# Patient Record
Sex: FEMALE | Race: WHITE | NOT HISPANIC OR LATINO | URBAN - METROPOLITAN AREA
[De-identification: names, ages, dates, MRNs, and addresses within clinical notes are randomized per-mention and may not be internally consistent; named-entity substitution may affect disease eponyms.]

---

## 2018-05-21 ENCOUNTER — OUTPATIENT (OUTPATIENT)
Dept: OUTPATIENT SERVICES | Facility: HOSPITAL | Age: 23
LOS: 1 days | Discharge: HOME | End: 2018-05-21

## 2018-05-21 DIAGNOSIS — R10.2 PELVIC AND PERINEAL PAIN: ICD-10-CM

## 2018-12-24 ENCOUNTER — TRANSCRIPTION ENCOUNTER (OUTPATIENT)
Age: 23
End: 2018-12-24

## 2023-02-14 ENCOUNTER — APPOINTMENT (OUTPATIENT)
Dept: OBGYN | Facility: CLINIC | Age: 28
End: 2023-02-14
Payer: MEDICAID

## 2023-02-14 DIAGNOSIS — N39.0 URINARY TRACT INFECTION, SITE NOT SPECIFIED: ICD-10-CM

## 2023-02-14 PROBLEM — Z00.00 ENCOUNTER FOR PREVENTIVE HEALTH EXAMINATION: Status: ACTIVE | Noted: 2023-02-14

## 2023-02-14 PROCEDURE — 81002 URINALYSIS NONAUTO W/O SCOPE: CPT

## 2023-02-16 LAB — BACTERIA UR CULT: NORMAL

## 2023-04-03 ENCOUNTER — APPOINTMENT (OUTPATIENT)
Dept: OBGYN | Facility: CLINIC | Age: 28
End: 2023-04-03
Payer: MEDICAID

## 2023-04-03 DIAGNOSIS — T14.8XXA OTHER INJURY OF UNSPECIFIED BODY REGION, INITIAL ENCOUNTER: ICD-10-CM

## 2023-04-03 DIAGNOSIS — S30.814A ABRASION OF VAGINA AND VULVA, INITIAL ENCOUNTER: ICD-10-CM

## 2023-04-03 PROCEDURE — 99203 OFFICE O/P NEW LOW 30 MIN: CPT

## 2023-04-06 ENCOUNTER — APPOINTMENT (OUTPATIENT)
Dept: DERMATOLOGY | Facility: CLINIC | Age: 28
End: 2023-04-06
Payer: MEDICAID

## 2023-04-06 PROCEDURE — 99203 OFFICE O/P NEW LOW 30 MIN: CPT

## 2023-04-11 PROBLEM — Z00.00 ENCOUNTER FOR PREVENTIVE HEALTH EXAMINATION: Status: ACTIVE | Noted: 2023-04-11

## 2023-05-02 ENCOUNTER — APPOINTMENT (OUTPATIENT)
Dept: DERMATOLOGY | Facility: CLINIC | Age: 28
End: 2023-05-02
Payer: MEDICAID

## 2023-05-02 PROCEDURE — 99205 OFFICE O/P NEW HI 60 MIN: CPT

## 2023-05-16 ENCOUNTER — RESULT CHARGE (OUTPATIENT)
Age: 28
End: 2023-05-16

## 2023-05-16 ENCOUNTER — APPOINTMENT (OUTPATIENT)
Dept: OBGYN | Facility: CLINIC | Age: 28
End: 2023-05-16
Payer: MEDICAID

## 2023-05-16 VITALS
BODY MASS INDEX: 32.44 KG/M2 | SYSTOLIC BLOOD PRESSURE: 125 MMHG | DIASTOLIC BLOOD PRESSURE: 86 MMHG | WEIGHT: 190 LBS | HEIGHT: 64 IN

## 2023-05-16 LAB
BILIRUB UR QL STRIP: NORMAL
GLUCOSE UR-MCNC: NORMAL
HCG UR QL: 0.2 EU/DL
HCG UR QL: NEGATIVE
HGB UR QL STRIP.AUTO: NORMAL
KETONES UR-MCNC: NORMAL
LEUKOCYTE ESTERASE UR QL STRIP: NORMAL
NITRITE UR QL STRIP: NORMAL
PH UR STRIP: 6
PROT UR STRIP-MCNC: NORMAL
SP GR UR STRIP: 1.01

## 2023-05-16 PROCEDURE — 99395 PREV VISIT EST AGE 18-39: CPT

## 2023-05-22 DIAGNOSIS — B96.89 ACUTE VAGINITIS: ICD-10-CM

## 2023-05-22 DIAGNOSIS — N76.0 ACUTE VAGINITIS: ICD-10-CM

## 2023-05-22 LAB — CYTOLOGY CVX/VAG DOC THIN PREP: ABNORMAL

## 2023-06-09 RX ORDER — ONDANSETRON 4 MG/1
4 TABLET ORAL
Qty: 30 | Refills: 0 | Status: ACTIVE | COMMUNITY
Start: 2023-06-09 | End: 1900-01-01

## 2023-06-28 ENCOUNTER — EMERGENCY (EMERGENCY)
Facility: HOSPITAL | Age: 28
LOS: 1 days | Discharge: HOME | End: 2023-06-28
Attending: EMERGENCY MEDICINE
Payer: COMMERCIAL

## 2023-06-28 VITALS
HEART RATE: 170 BPM | SYSTOLIC BLOOD PRESSURE: 120 MMHG | OXYGEN SATURATION: 98 % | RESPIRATION RATE: 16 BRPM | DIASTOLIC BLOOD PRESSURE: 77 MMHG | TEMPERATURE: 98 F

## 2023-06-28 VITALS
TEMPERATURE: 98 F | OXYGEN SATURATION: 100 % | RESPIRATION RATE: 18 BRPM | DIASTOLIC BLOOD PRESSURE: 65 MMHG | SYSTOLIC BLOOD PRESSURE: 118 MMHG | HEART RATE: 70 BPM

## 2023-06-28 LAB
ALBUMIN SERPL ELPH-MCNC: 4.8 G/DL — SIGNIFICANT CHANGE UP (ref 3.3–5.2)
ALP SERPL-CCNC: 66 U/L — SIGNIFICANT CHANGE UP (ref 40–120)
ALT FLD-CCNC: 20 U/L — SIGNIFICANT CHANGE UP
ANION GAP SERPL CALC-SCNC: 18 MMOL/L — HIGH (ref 5–17)
AST SERPL-CCNC: 31 U/L — SIGNIFICANT CHANGE UP
BASOPHILS # BLD AUTO: 0.06 K/UL — SIGNIFICANT CHANGE UP (ref 0–0.2)
BASOPHILS NFR BLD AUTO: 0.5 % — SIGNIFICANT CHANGE UP (ref 0–2)
BILIRUB SERPL-MCNC: 0.7 MG/DL — SIGNIFICANT CHANGE UP (ref 0.4–2)
BUN SERPL-MCNC: 10 MG/DL — SIGNIFICANT CHANGE UP (ref 8–20)
CALCIUM SERPL-MCNC: 9.6 MG/DL — SIGNIFICANT CHANGE UP (ref 8.4–10.5)
CHLORIDE SERPL-SCNC: 99 MMOL/L — SIGNIFICANT CHANGE UP (ref 96–108)
CO2 SERPL-SCNC: 18 MMOL/L — LOW (ref 22–29)
CREAT SERPL-MCNC: 0.65 MG/DL — SIGNIFICANT CHANGE UP (ref 0.5–1.3)
EGFR: 122 ML/MIN/1.73M2 — SIGNIFICANT CHANGE UP
EOSINOPHIL # BLD AUTO: 0.1 K/UL — SIGNIFICANT CHANGE UP (ref 0–0.5)
EOSINOPHIL NFR BLD AUTO: 0.8 % — SIGNIFICANT CHANGE UP (ref 0–6)
GLUCOSE SERPL-MCNC: 83 MG/DL — SIGNIFICANT CHANGE UP (ref 70–99)
HCG SERPL-ACNC: <4 MIU/ML — SIGNIFICANT CHANGE UP
HCT VFR BLD CALC: 42.1 % — SIGNIFICANT CHANGE UP (ref 34.5–45)
HGB BLD-MCNC: 15.1 G/DL — SIGNIFICANT CHANGE UP (ref 11.5–15.5)
IMM GRANULOCYTES NFR BLD AUTO: 0.5 % — SIGNIFICANT CHANGE UP (ref 0–0.9)
LYMPHOCYTES # BLD AUTO: 2.51 K/UL — SIGNIFICANT CHANGE UP (ref 1–3.3)
LYMPHOCYTES # BLD AUTO: 20.9 % — SIGNIFICANT CHANGE UP (ref 13–44)
MCHC RBC-ENTMCNC: 31 PG — SIGNIFICANT CHANGE UP (ref 27–34)
MCHC RBC-ENTMCNC: 35.9 GM/DL — SIGNIFICANT CHANGE UP (ref 32–36)
MCV RBC AUTO: 86.4 FL — SIGNIFICANT CHANGE UP (ref 80–100)
MONOCYTES # BLD AUTO: 0.83 K/UL — SIGNIFICANT CHANGE UP (ref 0–0.9)
MONOCYTES NFR BLD AUTO: 6.9 % — SIGNIFICANT CHANGE UP (ref 2–14)
NEUTROPHILS # BLD AUTO: 8.47 K/UL — HIGH (ref 1.8–7.4)
NEUTROPHILS NFR BLD AUTO: 70.4 % — SIGNIFICANT CHANGE UP (ref 43–77)
PLATELET # BLD AUTO: 296 K/UL — SIGNIFICANT CHANGE UP (ref 150–400)
POTASSIUM SERPL-MCNC: 4.2 MMOL/L — SIGNIFICANT CHANGE UP (ref 3.5–5.3)
POTASSIUM SERPL-SCNC: 4.2 MMOL/L — SIGNIFICANT CHANGE UP (ref 3.5–5.3)
PROT SERPL-MCNC: 8.5 G/DL — SIGNIFICANT CHANGE UP (ref 6.6–8.7)
RBC # BLD: 4.87 M/UL — SIGNIFICANT CHANGE UP (ref 3.8–5.2)
RBC # FLD: 12 % — SIGNIFICANT CHANGE UP (ref 10.3–14.5)
SODIUM SERPL-SCNC: 135 MMOL/L — SIGNIFICANT CHANGE UP (ref 135–145)
T3 SERPL-MCNC: 123 NG/DL — SIGNIFICANT CHANGE UP (ref 80–200)
T4 AB SER-ACNC: 7 UG/DL — SIGNIFICANT CHANGE UP (ref 4.5–12)
TROPONIN T SERPL-MCNC: <0.01 NG/ML — SIGNIFICANT CHANGE UP (ref 0–0.06)
TSH SERPL-MCNC: 2.73 UIU/ML — SIGNIFICANT CHANGE UP (ref 0.27–4.2)
WBC # BLD: 12.03 K/UL — HIGH (ref 3.8–10.5)
WBC # FLD AUTO: 12.03 K/UL — HIGH (ref 3.8–10.5)

## 2023-06-28 PROCEDURE — 99285 EMERGENCY DEPT VISIT HI MDM: CPT

## 2023-06-28 PROCEDURE — 71045 X-RAY EXAM CHEST 1 VIEW: CPT

## 2023-06-28 PROCEDURE — 84484 ASSAY OF TROPONIN QUANT: CPT

## 2023-06-28 PROCEDURE — 84436 ASSAY OF TOTAL THYROXINE: CPT

## 2023-06-28 PROCEDURE — 93010 ELECTROCARDIOGRAM REPORT: CPT | Mod: 76

## 2023-06-28 PROCEDURE — 71045 X-RAY EXAM CHEST 1 VIEW: CPT | Mod: 26

## 2023-06-28 PROCEDURE — 93005 ELECTROCARDIOGRAM TRACING: CPT

## 2023-06-28 PROCEDURE — 80053 COMPREHEN METABOLIC PANEL: CPT

## 2023-06-28 PROCEDURE — 36415 COLL VENOUS BLD VENIPUNCTURE: CPT

## 2023-06-28 PROCEDURE — 99285 EMERGENCY DEPT VISIT HI MDM: CPT | Mod: 25

## 2023-06-28 PROCEDURE — 84443 ASSAY THYROID STIM HORMONE: CPT

## 2023-06-28 PROCEDURE — 85025 COMPLETE CBC W/AUTO DIFF WBC: CPT

## 2023-06-28 PROCEDURE — 99291 CRITICAL CARE FIRST HOUR: CPT

## 2023-06-28 PROCEDURE — 84702 CHORIONIC GONADOTROPIN TEST: CPT

## 2023-06-28 PROCEDURE — 84480 ASSAY TRIIODOTHYRONINE (T3): CPT

## 2023-06-28 NOTE — ED ADULT NURSE NOTE - CHIEF COMPLAINT QUOTE
pt comes to ed from work- reports after lunch started feeling her heart rate was high- patient noted to have a HR of 170 in triage. Direct to [facility] for ekg. no other symptoms- no fevers or recent illness.

## 2023-06-28 NOTE — ED PROVIDER NOTE - CARE PROVIDER_API CALL
Marlene Posey  Cardiology  [address] [facility], [address] [place] [address]  Phone: [phone]  Fax: [phone]  Follow Up Time:

## 2023-06-28 NOTE — ED PROVIDER NOTE - PATIENT PORTAL LINK FT
You can access the FollowMyHealth Patient Portal offered by [facility] by registering at the following website: [email or web address]. By joining [facility]’s FollowMyHealth portal, you will also be able to view your health information using other applications (apps) compatible with our system.

## 2023-06-28 NOTE — ED PROVIDER NOTE - ADDITIONAL NOTES AND INSTRUCTIONS:
Ms. Castillo was seen in the emergency room on 6/28. Based on evaluation by this physician, she may return to work tomorrow, 6/28. She should also follow up with the specialist outpatient as discussed.

## 2023-06-28 NOTE — ED ADULT NURSE REASSESSMENT NOTE - NS ED NURSE REASSESS COMMENT FT1
assumed care from critical RN. pt a&o x4, RR even and unlabored, skin warm and dry. pt HR 83 in NSR on CM, resting comfortably in bed, no complaints at this time.

## 2023-06-28 NOTE — ED ADULT TRIAGE NOTE - CHIEF COMPLAINT QUOTE
pt comes to ed from work- reports after lunch started feeling her heart rate was high- patient noted to have a HR of 170 in triage. Direct to [facility] for ekg. no other symptoms- no fevers or recent illness.

## 2023-06-28 NOTE — ED PROVIDER NOTE - PHYSICAL EXAMINATION
General: well appearing, NAD  Head: NC/AT  Cardiac: tachycardic, regular, no m/r/g  Respiratory: CTABL, equal chest wall expansions, no conversational dyspnea  Abdomen: soft, ND, NT  Neuro: AAOx3, coordinated movement of all 4 extremities  Psych: anxious, cooperative, normal affect  Skin: warm and dry

## 2023-06-28 NOTE — ED PROVIDER NOTE - CLINICAL SUMMARY MEDICAL DECISION MAKING FREE TEXT BOX
28-year-old female W/PMH Chiari I malformation, bipolar noncompliant with meds, anxiety, polysubstance use presents for tachycardia.  EKG shows SVT 160s. Adenosine 6mg then 12mg given, with conversion to NSR with HR 90s. Labs pending.

## 2023-06-28 NOTE — ED PROVIDER NOTE - NS ED ROS FT
Constitutional: no fever, no sweats, and no chills.  CV: no chest pain, no edema. +palpitations  Resp: no cough, no dyspnea  GI: no abdominal pain, +nausea and no vomiting.  MSK: no msk pain, no weakness  Skin: no lesions, and no rashes.  Neuro: no LOC, no headache, no dizziness  ROS otherwise negative except as noted in HPI.

## 2023-06-28 NOTE — ED PROVIDER NOTE - OBJECTIVE STATEMENT
28-year-old female W/PMH Chiari I malformation, bipolar noncompliant with meds, anxiety, polysubstance use presents for tachycardia.  Reports she just got back from lunch, was seated, developed nausea and palpitations. Denies fever/chills, chest pain, SOB, vomiting. Denies any similar symptoms in the past.  Reports her Apple Watch showed a heart rate 160s, and the doctor she works with told her to go to the ER.  Denies any personal cardiac history.  Reports family history uncle just had a hole in his heart repaired. Reports she smokes marijuana daily, uses alcohol occasionally, cocaine occasionally, shrooms rarely. Reports only marijuana the past few days, last cocaine 7 months ago. +smoker.

## 2023-06-28 NOTE — ED PROVIDER NOTE - NSFOLLOWUPINSTRUCTIONS_ED_ALL_ED_FT
- Follow up with cardiology at the next available appointment.   - Return to the ED for any new or worsening symptoms, such as chest pain, shortness of breath, or passing out.      Supraventricular Tachycardia, Adult  Supraventricular tachycardia (SVT) is a type of abnormal heart rhythm. It causes the heart to beat very quickly. SVT can start suddenly and last for a short time, which is called paroxysmal SVT, or it may last longer and require specialized treatment to return the heart rhythm to normal.    A normal resting heart rate is 60–100 beats per minute. During an episode of SVT, your heart rate may be higher than 150 beats per minute. Episodes of SVT can be frightening, but they are usually not dangerous. However, if episodes happen several times a day or last longer than a few seconds, they may lead to heart failure.    What are the causes?    Usually, a normal heartbeat starts when an area called the sinoatrial node releases an electrical signal. In SVT, other areas of the heart send out electrical signals that interfere with the signal from the sinoatrial node. The cause of this abnormal electrical activity is not known.    What increases the risk?  You are more likely to develop this condition if you are:  Middle aged or younger.  Female.  The following factors may also make you more likely to develop this condition:  Stress or anxiety.  Tiredness.  Smoking.  Stimulant drugs, such as cocaine and methamphetamine.  Alcohol.  Caffeine.  Pregnancy.  Having any of these conditions:  A thyroid condition.  Diabetes mellitus.  Obstructive sleep apnea.  What are the signs or symptoms?  Symptoms of this condition include:  A pounding heart.  A feeling that the heart is skipping beats (palpitations).  Weakness.  Shortness of breath.  Tightness or pain in your chest.  Light-headedness or dizziness.  Anxiety.  Sweating.  Nausea.  Fainting.  Fatigue or tiredness.  A mild episode may not cause symptoms.    How is this diagnosed?  This condition may be diagnosed based on:  Your symptoms.  A physical exam. If you have an episode of SVT during the exam, the health care provider may be able to diagnose SVT by listening to your heart and feeling your pulse.  Tests. These may include:  An electrocardiogram (ECG). This test is done to check for problems with electrical activity in the heart.  A Holter monitor or event monitor test. This test involves wearing a portable device that monitors your heart rate over time.  An echocardiogram. This test involves taking an image of your heart using sound waves. It is done to rule out other causes of a fast heart rate.  A stress echocardiogram. This test involves doing an echocardiogram when you are at rest and after exercise.  Blood tests.  An electrophysiology study (EPS). This tests the electrical activity in your heart to find where the abnormal heart rhythm is coming from using cardiac catheters.  How is this treated?  This condition may be treated with:  Vagal nerve stimulation. This involves stimulating your vagus nerve, which is a nerve that runs from the chest, through the neck, to the lower part of the brain. Stimulating this nerve can slow down the heart. It is often the first and only treatment that is needed for this condition. Work with your health care provider to find which technique works best for you. Ways to do this treatment include:  Laying on your back, then holding your breath and pushing, as though you are having a bowel movement.  Massaging an area on one side of your neck, below your jaw. Do not try this yourself. Only a health care provider should do this. If done the wrong way, it can lead to a stroke.  Bending forward with your head between your legs.  Coughing while bending forward with your head between your legs.  Applying an ice-cold, wet towel to your face.  Medicines that prevent attacks.  Medicine to stop an attack. The medicine is given through an IV at the hospital.  A small electric shock (cardioversion) that stops an attack. Before you get the shock, you will get medicine to make you fall asleep.  Radiofrequency ablation. In this procedure, a small, thin tube (catheter) is used to send radiofrequency energy to the area of tissue that is causing the rapid heartbeats. The energy kills the cells and helps your heart keep a normal rhythm. You may have this treatment if you have symptoms of SVT often.  If you do not have symptoms, you may not need treatment.    Follow these instructions at home:  Stress    Avoid stressful situations when possible.  Find healthy ways of managing stress, such as:  Taking part in relaxing activities, such as yoga, meditation, or being out in nature.  Listening to relaxing music.  Practicing relaxation techniques, such as deep breathing.  Leading a healthy lifestyle. This involves getting plenty of sleep, exercising, and eating a balanced diet.  Attending counseling or talk therapy with a mental health professional.  Lifestyle      Try to get at least 7 hours of sleep each night.  Do not use any products that contain nicotine or tobacco. These products include cigarettes, chewing tobacco, and vaping devices, such as e-cigarettes. If you need help quitting, ask your health care provider.  Do not drink alcohol if it triggers episodes of SVT.  If alcohol does not seem to trigger episodes, limit your alcohol intake. If you drink alcohol:  Limit how much you have to:  0–1 drink a day for women who are not pregnant.  0–2 drinks a day for men.  Know how much alcohol is in your drink. In the [place] one drink equals one 12 oz bottle of beer (355 mL), one 5 oz glass of wine (148 mL), or one 1½ oz glass of hard liquor (44 mL).  Be aware of how caffeine affects your condition. If caffeine:  Triggers episodes of SVT, do not eat, drink, or use anything with caffeine in it.  Does not seem to trigger episodes, consume caffeine in moderation.  Do not use stimulant drugs. If you need help quitting, talk with your health care provider.  General instructions    Maintain a healthy weight.  Exercise regularly. Ask your health care provider to suggest some good activities for you. Aim for one or a combination of the followin minutes per week of moderate exercise, such as walking or yoga.  75 minutes per week of vigorous exercise, such as running or swimming.  Perform vagus nerve stimulation as directed by your health care provider.  Take over-the-counter and prescription medicines only as told by your health care provider.  Keep all follow-up visits. This is important.  Contact a health care provider if:  You have episodes of SVT more often than before.  Episodes of SVT last longer than before.  Vagus nerve stimulation is no longer helping.  You have new symptoms.  Get help right away if:  You have chest pain.  Your symptoms get worse.  You have trouble breathing.  You have an episode of SVT that lasts longer than 20 minutes.  You faint.  These symptoms may represent a serious problem that is an emergency. Do not wait to see if the symptoms will go away. Get medical help right away. Call your local emergency services (911 in the [place] Do not drive yourself to the hospital.    Summary  Supraventricular tachycardia (SVT) is a type of abnormal heart rhythm.  During an episode of SVT, your heart rate may be higher than 150 beats per minute.  If you do not have symptoms, you may not need treatment.  This information is not intended to replace advice given to you by your health care provider. Make sure you discuss any questions you have with your health care provider.

## 2023-06-29 ENCOUNTER — NON-APPOINTMENT (OUTPATIENT)
Age: 28
End: 2023-06-29

## 2023-06-29 ENCOUNTER — APPOINTMENT (OUTPATIENT)
Dept: CARDIOLOGY | Facility: CLINIC | Age: 28
End: 2023-06-29
Payer: MEDICAID

## 2023-06-29 VITALS
SYSTOLIC BLOOD PRESSURE: 104 MMHG | HEIGHT: 64 IN | OXYGEN SATURATION: 97 % | HEART RATE: 87 BPM | DIASTOLIC BLOOD PRESSURE: 67 MMHG | BODY MASS INDEX: 33.12 KG/M2 | WEIGHT: 194 LBS

## 2023-06-29 VITALS — SYSTOLIC BLOOD PRESSURE: 115 MMHG | DIASTOLIC BLOOD PRESSURE: 74 MMHG

## 2023-06-29 DIAGNOSIS — G40.409 OTHER GENERALIZED EPILEPSY AND EPILEPTIC SYNDROMES, NOT INTRACTABLE, W/OUT STATUS EPILEPTICUS: ICD-10-CM

## 2023-06-29 DIAGNOSIS — F17.200 NICOTINE DEPENDENCE, UNSPECIFIED, UNCOMPLICATED: ICD-10-CM

## 2023-06-29 DIAGNOSIS — F31.9 BIPOLAR DISORDER, UNSPECIFIED: ICD-10-CM

## 2023-06-29 DIAGNOSIS — Z78.9 OTHER SPECIFIED HEALTH STATUS: ICD-10-CM

## 2023-06-29 DIAGNOSIS — G93.5 COMPRESSION OF BRAIN: ICD-10-CM

## 2023-06-29 PROCEDURE — 99204 OFFICE O/P NEW MOD 45 MIN: CPT | Mod: 25

## 2023-06-29 PROCEDURE — 93000 ELECTROCARDIOGRAM COMPLETE: CPT

## 2023-06-29 RX ORDER — METRONIDAZOLE 500 MG/1
500 TABLET ORAL TWICE DAILY
Qty: 14 | Refills: 0 | Status: DISCONTINUED | COMMUNITY
Start: 2023-05-22 | End: 2023-06-29

## 2023-06-29 RX ORDER — LIDOCAINE 5 G/100G
5 OINTMENT TOPICAL
Qty: 1 | Refills: 1 | Status: DISCONTINUED | COMMUNITY
Start: 2023-04-03 | End: 2023-06-29

## 2023-06-29 RX ORDER — NITROFURANTOIN (MONOHYDRATE/MACROCRYSTALS) 25; 75 MG/1; MG/1
100 CAPSULE ORAL TWICE DAILY
Qty: 10 | Refills: 0 | Status: DISCONTINUED | COMMUNITY
Start: 2023-02-14 | End: 2023-06-29

## 2023-06-29 NOTE — PHYSICAL EXAM

## 2023-06-29 NOTE — REVIEW OF SYSTEMS
[Negative] : Heme/Lymph [FreeTextEntry5] : See HPI [de-identified] : See HPI [de-identified] : See HPI

## 2023-06-29 NOTE — ASSESSMENT
[FreeTextEntry1] : EKG: June 29, 2023: Normal sinus rhythm, normal ECG\par \par Normal TSH done in the ER on June 28, 2023.  Normal labs\par \par Assessment:\par 1.  PSVT\par 2.  Polysubstance abuse\par 3.  Bipolar disorder\par 4.  Chiari malformation\par \par Recommendations:\par 1.  Echocardiogram and regular stress test\par 2.  30-day event monitor\par 3.  EP consultation\par 4.  Patient counseled against substance abuse.  Patient is also strongly recommended to establish with a PCP.\par 5.  Follow-up in 3 months

## 2023-06-29 NOTE — HISTORY OF PRESENT ILLNESS
[FreeTextEntry1] : HPI: Patient is a 28-year-old  female W/PMH Chiari I malformation, bipolar noncompliant with meds, anxiety, polysubstance use who was seen at [facility] on June 28 for symptoms of palpitations.  Hospital records reviewed.  Patient had a PSVT and it was treated with IV adenosine converted to sinus rhythm.  Hospital lab work normal including a normal TSH.  I could not see the EKG in the hospital system.  Patient is having symptoms of intermittent palpitations lasting up to 10 minutes for at least a year, she has 1 episode at least once a week, she had an episode of syncope about a year ago.  Patient denies any chest pain, orthopnea, PND or leg edema\par \par \par \par \par PMH: Bipolar disorder, Chiari malformation.  Noncompliant with her medical therapy.  No diabetes or hypertension.\par \par Social History: Patient admits to smoking history of cocaine use occasionally, history of drinking alcohol\par \par Family history: Noncontributory.\par \par ER HPI\par 28-year-old female W/PMH Chiari I malformation, bipolar noncompliant with meds, anxiety, polysubstance use presents for tachycardia.  Reports she just got back from lunch, was seated, developed nausea and palpitations. Denies fever/chills, chest pain, SOB, vomiting. Denies any similar symptoms in the past.  Reports her Apple Watch showed a heart rate 160s, and the doctor she works with told her to go to the ER.  Denies any personal cardiac history.  Reports family history uncle just had a hole in his heart repaired. Reports she smokes marijuana daily, uses alcohol occasionally, cocaine occasionally, shrooms rarely. Reports only marijuana the past few days, last cocaine 7 months ago. +smoker.

## 2023-07-02 LAB — BACTERIA UR CULT: NORMAL

## 2023-07-11 DIAGNOSIS — Z87.718 PERSONAL HISTORY OF OTHER SPECIFIED (CORRECTED) CONGENITAL MALFORMATIONS OF GENITOURINARY SYSTEM: ICD-10-CM

## 2023-07-11 DIAGNOSIS — Z87.42 PERSONAL HISTORY OF OTHER DISEASES OF THE FEMALE GENITAL TRACT: ICD-10-CM

## 2023-07-11 DIAGNOSIS — Z86.59 PERSONAL HISTORY OF OTHER MENTAL AND BEHAVIORAL DISORDERS: ICD-10-CM

## 2023-07-11 PROBLEM — S30.814A VAGINAL ABRASION, INITIAL ENCOUNTER: Status: ACTIVE | Noted: 2023-07-11

## 2023-07-11 NOTE — PLAN
[FreeTextEntry1] : 28-year-old female with vaginal abrasion after intercourse.  We discussed that this is most likely the cause of her symptoms.  Discussed with the patient that this area can be very tender when urinating.  Rx was provided for lidocaine ointment.  Also discussed peribottle when urinating.  Advised to keep the area as clean and dry as possible.  If she is not feeling better in 1 week's time she can return to the office.  She will return to the office in May 2023 for her well woman exam.  The patient was given the opportunity to ask questions and all were answered to her satisfaction.\par

## 2023-07-11 NOTE — HISTORY OF PRESENT ILLNESS
[FreeTextEntry1] : 28-year-old female presents for visit to discuss vaginal discomfort.  She is a new patient to our practice.  Her last annual exam was a little over 1 year ago.  She states she had intercourse yesterday and thinks she has a cut near the vaginal area.  She states her vaginal area is very tender and it is stinging when she is urinating.  She denies any urinary frequency, urgency, or dysuria.  She states she looked in her vaginal area today and thinks that there is a small laceration.  She is not having any vaginal bleeding.\par \par Past medical history is significant for bipolar disorder, ADHD, anxiety, seizures, and a Budd-Chiari malformation.  She has never had surgery.  She is unsure of her family history.  She does smoke tobacco approximately 1 pack every 3 days.  She socially drinks alcohol.  She denies illicit drugs.  GYN history: –45/3–7.  She has a history of PCOS.  She was on OCPs in the past but states they gave her night terrors.  She was told in the past that she has a bicornuate uterus.  She has a history of ovarian cysts.  She has a history of an abnormal Pap smear with positive HPV.  She has no other significant GYN history.  OB history: -0-1-0.  She has a history of a medical etop.  No known drug allergies.  She takes Lamictal and Prozac.

## 2023-07-11 NOTE — HISTORY OF PRESENT ILLNESS
[FreeTextEntry1] : 28-year-old female presents for well woman exam.  She has no complaints today.  She has a history of PCOS.  Menses are currently every 28 to 45 days, lasting 3 to 7 days.  She denies heavy bleeding.  She denies cramping.  She was on OCPs in the past to help regulate her cycle but they gave her night terrors.  She has never went more than 3 months without her menses.  She is not currently interested in starting contraception.  She has a history of an ovarian cyst and an abnormal Pap smear.  She was also told in the past she has a bicornuate uterus.\par \par \par Past medical history is significant for bipolar disorder, ADHD, anxiety, seizures, and a Budd-Chiari malformation.  She has never had surgery.  She is unsure of her family history.  She does smoke tobacco approximately 1 pack every 3 days.  She socially drinks alcohol.  She denies illicit drugs.  GYN history as above.  OB history: -0-1-0.  She has a history of a medical etop.  No known drug allergies.  She takes Lamictal and Prozac.

## 2023-07-11 NOTE — PHYSICAL EXAM
[Chaperone Declined] : Patient declined chaperone [Appropriately responsive] : appropriately responsive [Alert] : alert [No Acute Distress] : no acute distress [Soft] : soft [Non-tender] : non-tender [Non-distended] : non-distended [No HSM] : No HSM [No Lesions] : no lesions [No Mass] : no mass [Oriented x3] : oriented x3 [Labia Majora] : normal [Labia Minora] : normal [Normal] : normal [Uterine Adnexae] : normal [FreeTextEntry1] : + 5mm abrasion to inner portion of right labia minora

## 2023-07-11 NOTE — PLAN
[FreeTextEntry1] : 28-year-old female for well woman exam\par \par 1.  Pap done\par 2.  Self breast exam reviewed\par 3.  History of PCOS.  Menses are currently every 28 to 45 days.  We discussed if she goes more than 3 months without her menses she should call the office.  She is aware of the risks of endometrial hyperplasia/cancer in the future if she does not get her menses at least every 3 months.\par 4.  History of bicornuate uterus.  Declines further work-up at this time.  Aware of potential future fertility/pregnancy complications.\par 5.  Annual exam in 1 year

## 2023-07-18 ENCOUNTER — NON-APPOINTMENT (OUTPATIENT)
Age: 28
End: 2023-07-18

## 2023-07-26 ENCOUNTER — APPOINTMENT (OUTPATIENT)
Dept: ELECTROPHYSIOLOGY | Facility: CLINIC | Age: 28
End: 2023-07-26
Payer: MEDICAID

## 2023-07-26 VITALS
SYSTOLIC BLOOD PRESSURE: 118 MMHG | BODY MASS INDEX: 32.78 KG/M2 | WEIGHT: 192 LBS | OXYGEN SATURATION: 97 % | DIASTOLIC BLOOD PRESSURE: 64 MMHG | HEIGHT: 64 IN | HEART RATE: 86 BPM | TEMPERATURE: 98.9 F

## 2023-07-26 PROCEDURE — 93000 ELECTROCARDIOGRAM COMPLETE: CPT

## 2023-07-26 PROCEDURE — 99203 OFFICE O/P NEW LOW 30 MIN: CPT | Mod: 25

## 2023-07-26 NOTE — HISTORY OF PRESENT ILLNESS
[FreeTextEntry1] : Recent ER visit for prolonged palpitations and elevated HR. Approx 160-180 bpm. Was given adenosine x2 and reports symptoms gradually subsided. Reports prior hx of of palpitations with respect to anxiety. She is wearing a biotel monitor which she will complete next week. Echo and stress test pending. No further episodes since then.

## 2023-08-11 ENCOUNTER — APPOINTMENT (OUTPATIENT)
Dept: DERMATOLOGY | Facility: CLINIC | Age: 28
End: 2023-08-11
Payer: MEDICAID

## 2023-08-11 PROCEDURE — 99213 OFFICE O/P EST LOW 20 MIN: CPT

## 2023-08-14 ENCOUNTER — NON-APPOINTMENT (OUTPATIENT)
Age: 28
End: 2023-08-14

## 2023-08-21 DIAGNOSIS — N94.6 DYSMENORRHEA, UNSPECIFIED: ICD-10-CM

## 2023-09-05 ENCOUNTER — APPOINTMENT (OUTPATIENT)
Dept: CARDIOLOGY | Facility: CLINIC | Age: 28
End: 2023-09-05
Payer: MEDICAID

## 2023-09-05 PROCEDURE — 93306 TTE W/DOPPLER COMPLETE: CPT

## 2023-09-05 PROCEDURE — 93015 CV STRESS TEST SUPVJ I&R: CPT

## 2023-09-25 ENCOUNTER — APPOINTMENT (OUTPATIENT)
Dept: CARDIOLOGY | Facility: CLINIC | Age: 28
End: 2023-09-25

## 2023-09-29 ENCOUNTER — APPOINTMENT (OUTPATIENT)
Dept: ELECTROPHYSIOLOGY | Facility: CLINIC | Age: 28
End: 2023-09-29

## 2023-10-12 PROBLEM — G93.5 COMPRESSION OF BRAIN: Chronic | Status: ACTIVE | Noted: 2023-06-28

## 2023-10-12 PROBLEM — F31.9 BIPOLAR DISORDER, UNSPECIFIED: Chronic | Status: ACTIVE | Noted: 2023-06-28

## 2023-10-24 ENCOUNTER — RX RENEWAL (OUTPATIENT)
Age: 28
End: 2023-10-24

## 2023-11-07 DIAGNOSIS — L25.9 UNSPECIFIED CONTACT DERMATITIS, UNSPECIFIED CAUSE: ICD-10-CM

## 2023-11-24 ENCOUNTER — NON-APPOINTMENT (OUTPATIENT)
Age: 28
End: 2023-11-24

## 2023-11-24 ENCOUNTER — APPOINTMENT (OUTPATIENT)
Dept: ELECTROPHYSIOLOGY | Facility: CLINIC | Age: 28
End: 2023-11-24
Payer: MEDICAID

## 2023-11-24 VITALS
SYSTOLIC BLOOD PRESSURE: 126 MMHG | HEIGHT: 64 IN | OXYGEN SATURATION: 99 % | HEART RATE: 76 BPM | BODY MASS INDEX: 33.12 KG/M2 | WEIGHT: 194 LBS | DIASTOLIC BLOOD PRESSURE: 74 MMHG

## 2023-11-24 PROCEDURE — 93000 ELECTROCARDIOGRAM COMPLETE: CPT

## 2023-11-24 PROCEDURE — 99214 OFFICE O/P EST MOD 30 MIN: CPT | Mod: 25

## 2023-12-04 DIAGNOSIS — R68.82 DECREASED LIBIDO: ICD-10-CM

## 2023-12-13 DIAGNOSIS — O21.9 VOMITING OF PREGNANCY, UNSPECIFIED: ICD-10-CM

## 2024-02-08 DIAGNOSIS — N39.3 STRESS INCONTINENCE (FEMALE) (MALE): ICD-10-CM

## 2024-02-22 ENCOUNTER — APPOINTMENT (OUTPATIENT)
Dept: OBGYN | Facility: CLINIC | Age: 29
End: 2024-02-22
Payer: SELF-PAY

## 2024-02-22 DIAGNOSIS — F41.9 ANXIETY DISORDER, UNSPECIFIED: ICD-10-CM

## 2024-02-22 DIAGNOSIS — F43.9 REACTION TO SEVERE STRESS, UNSPECIFIED: ICD-10-CM

## 2024-02-22 PROCEDURE — 97810 ACUP 1/> WO ESTIM 1ST 15 MIN: CPT

## 2024-02-26 ENCOUNTER — TRANSCRIPTION ENCOUNTER (OUTPATIENT)
Age: 29
End: 2024-02-26

## 2024-02-26 ENCOUNTER — INPATIENT (INPATIENT)
Facility: HOSPITAL | Age: 29
LOS: 0 days | Discharge: HOME | DRG: 273 | End: 2024-02-27
Attending: INTERNAL MEDICINE | Admitting: INTERNAL MEDICINE
Payer: COMMERCIAL

## 2024-02-26 VITALS
DIASTOLIC BLOOD PRESSURE: 60 MMHG | WEIGHT: 190.04 LBS | HEART RATE: 81 BPM | SYSTOLIC BLOOD PRESSURE: 110 MMHG | HEIGHT: 64 IN | OXYGEN SATURATION: 95 % | RESPIRATION RATE: 16 BRPM

## 2024-02-26 DIAGNOSIS — I47.1 SUPRAVENTRICULAR TACHYCARDIA: ICD-10-CM

## 2024-02-26 DIAGNOSIS — J90 PLEURAL EFFUSION, NOT ELSEWHERE CLASSIFIED: ICD-10-CM

## 2024-02-26 LAB
ABO RH CONFIRMATION: SIGNIFICANT CHANGE UP
ANION GAP SERPL CALC-SCNC: 11 MMOL/L — SIGNIFICANT CHANGE UP (ref 5–17)
APTT BLD: 31.8 SEC — SIGNIFICANT CHANGE UP (ref 24.5–35.6)
BLD GP AB SCN SERPL QL: SIGNIFICANT CHANGE UP
BUN SERPL-MCNC: 10.5 MG/DL — SIGNIFICANT CHANGE UP (ref 8–20)
CALCIUM SERPL-MCNC: 9.1 MG/DL — SIGNIFICANT CHANGE UP (ref 8.4–10.5)
CHLORIDE SERPL-SCNC: 106 MMOL/L — SIGNIFICANT CHANGE UP (ref 96–108)
CO2 SERPL-SCNC: 22 MMOL/L — SIGNIFICANT CHANGE UP (ref 22–29)
CREAT SERPL-MCNC: 0.59 MG/DL — SIGNIFICANT CHANGE UP (ref 0.5–1.3)
EGFR: 125 ML/MIN/1.73M2 — SIGNIFICANT CHANGE UP
GLUCOSE SERPL-MCNC: 104 MG/DL — HIGH (ref 70–99)
HCG SERPL QL: NEGATIVE — SIGNIFICANT CHANGE UP
HCT VFR BLD CALC: 41 % — SIGNIFICANT CHANGE UP (ref 34.5–45)
HGB BLD-MCNC: 14.5 G/DL — SIGNIFICANT CHANGE UP (ref 11.5–15.5)
INR BLD: 0.9 RATIO — SIGNIFICANT CHANGE UP (ref 0.85–1.18)
MAGNESIUM SERPL-MCNC: 1.9 MG/DL — SIGNIFICANT CHANGE UP (ref 1.6–2.6)
MCHC RBC-ENTMCNC: 30.6 PG — SIGNIFICANT CHANGE UP (ref 27–34)
MCHC RBC-ENTMCNC: 35.4 GM/DL — SIGNIFICANT CHANGE UP (ref 32–36)
MCV RBC AUTO: 86.5 FL — SIGNIFICANT CHANGE UP (ref 80–100)
PLATELET # BLD AUTO: 242 K/UL — SIGNIFICANT CHANGE UP (ref 150–400)
POTASSIUM SERPL-MCNC: 3.9 MMOL/L — SIGNIFICANT CHANGE UP (ref 3.5–5.3)
POTASSIUM SERPL-SCNC: 3.9 MMOL/L — SIGNIFICANT CHANGE UP (ref 3.5–5.3)
PROTHROM AB SERPL-ACNC: 10 SEC — SIGNIFICANT CHANGE UP (ref 9.5–13)
RBC # BLD: 4.74 M/UL — SIGNIFICANT CHANGE UP (ref 3.8–5.2)
RBC # FLD: 12.9 % — SIGNIFICANT CHANGE UP (ref 10.3–14.5)
SODIUM SERPL-SCNC: 139 MMOL/L — SIGNIFICANT CHANGE UP (ref 135–145)
WBC # BLD: 9.05 K/UL — SIGNIFICANT CHANGE UP (ref 3.8–10.5)
WBC # FLD AUTO: 9.05 K/UL — SIGNIFICANT CHANGE UP (ref 3.8–10.5)

## 2024-02-26 PROCEDURE — 93623 PRGRMD STIMJ&PACG IV RX NFS: CPT | Mod: 26

## 2024-02-26 PROCEDURE — 93010 ELECTROCARDIOGRAM REPORT: CPT

## 2024-02-26 PROCEDURE — 93653 COMPRE EP EVAL TX SVT: CPT

## 2024-02-26 PROCEDURE — 93462 L HRT CATH TRNSPTL PUNCTURE: CPT

## 2024-02-26 PROCEDURE — 93662 INTRACARDIAC ECG (ICE): CPT | Mod: 26

## 2024-02-26 RX ORDER — ALPRAZOLAM 0.25 MG
0.25 TABLET ORAL EVERY 6 HOURS
Refills: 0 | Status: DISCONTINUED | OUTPATIENT
Start: 2024-02-26 | End: 2024-02-27

## 2024-02-26 RX ORDER — ACETAMINOPHEN 500 MG
650 TABLET ORAL EVERY 6 HOURS
Refills: 0 | Status: DISCONTINUED | OUTPATIENT
Start: 2024-02-26 | End: 2024-02-27

## 2024-02-26 RX ORDER — CARVEDILOL PHOSPHATE 80 MG/1
3.12 CAPSULE, EXTENDED RELEASE ORAL EVERY 12 HOURS
Refills: 0 | Status: DISCONTINUED | OUTPATIENT
Start: 2024-02-26 | End: 2024-02-27

## 2024-02-26 RX ORDER — NICOTINE POLACRILEX 2 MG
1 GUM BUCCAL DAILY
Refills: 0 | Status: DISCONTINUED | OUTPATIENT
Start: 2024-02-26 | End: 2024-02-27

## 2024-02-26 RX ORDER — APIXABAN 2.5 MG/1
2.5 TABLET, FILM COATED ORAL
Refills: 0 | Status: DISCONTINUED | OUTPATIENT
Start: 2024-02-26 | End: 2024-02-27

## 2024-02-26 RX ORDER — BENZOCAINE AND MENTHOL 5; 1 G/100ML; G/100ML
1 LIQUID ORAL
Refills: 0 | Status: DISCONTINUED | OUTPATIENT
Start: 2024-02-26 | End: 2024-02-27

## 2024-02-26 RX ORDER — KETOROLAC TROMETHAMINE 30 MG/ML
15 SYRINGE (ML) INJECTION ONCE
Refills: 0 | Status: DISCONTINUED | OUTPATIENT
Start: 2024-02-26 | End: 2024-02-27

## 2024-02-26 RX ORDER — OXYCODONE AND ACETAMINOPHEN 5; 325 MG/1; MG/1
1 TABLET ORAL EVERY 6 HOURS
Refills: 0 | Status: DISCONTINUED | OUTPATIENT
Start: 2024-02-26 | End: 2024-02-27

## 2024-02-26 RX ADMIN — Medication 1 PATCH: at 20:02

## 2024-02-26 RX ADMIN — CARVEDILOL PHOSPHATE 3.12 MILLIGRAM(S): 80 CAPSULE, EXTENDED RELEASE ORAL at 17:42

## 2024-02-26 RX ADMIN — Medication 650 MILLIGRAM(S): at 16:09

## 2024-02-26 RX ADMIN — Medication 650 MILLIGRAM(S): at 15:21

## 2024-02-26 RX ADMIN — APIXABAN 2.5 MILLIGRAM(S): 2.5 TABLET, FILM COATED ORAL at 20:02

## 2024-02-26 NOTE — H&P PST ADULT - NSICDXPASTMEDICALHX_GEN_ALL_CORE_FT
PAST MEDICAL HISTORY:  Bipolar disorder     Chiari I malformation     Paroxysmal SVT (supraventricular tachycardia)      PAST MEDICAL HISTORY:  Bipolar disorder     Chiari I malformation     Current smoker     Paroxysmal SVT (supraventricular tachycardia)     Seizures

## 2024-02-26 NOTE — DISCHARGE NOTE PROVIDER - NSDCCPCAREPLAN_GEN_ALL_CORE_FT
PRINCIPAL DISCHARGE DIAGNOSIS  Diagnosis: Paroxysmal SVT (supraventricular tachycardia)  Assessment and Plan of Treatment:

## 2024-02-26 NOTE — DISCHARGE NOTE PROVIDER - CARE PROVIDER_API CALL
Umair Boyle  Cardiac Electrophysiology  [address] [facility], [address]  [place] [address]  Phone: [phone]  Fax: [phone]  Follow Up Time:     Casper Nelson  Cardiology  [address] [place] [address]  Phone: [phone]  Fax: [phone]  Follow Up Time:

## 2024-02-26 NOTE — DISCHARGE NOTE PROVIDER - NSDCFUADDINST_GEN_ALL_CORE_FT
Follow up with Dr. Boyle in 4-6 weeks, or sooner if needed. Our office will contact you in 3-5 days to schedule this appointment. Please call [phone] with questions or concerns. Follow up with Dr. Boyle in 4-6 weeks, or sooner if needed. Our office will contact you in 3-5 days to schedule this appointment. Please call [phone] with questions or concerns.  Start Eliquis 2.5mg twice daily x 30 days. Start Coreg 3.125mg twice daily.

## 2024-02-26 NOTE — DISCHARGE NOTE PROVIDER - CARE PROVIDERS DIRECT ADDRESSES
[email or web address]

## 2024-02-26 NOTE — DISCHARGE NOTE PROVIDER - HOSPITAL COURSE
28 year old female, active smoker, with Chiari 1 malformation, Bipolar disorder, ADD, polysubstance use (ETOH, marijuana, cocaine), seizures, and adenosine sensitive paroxysmal SVT. She has a long standing history of palpitation, and had a recent episode of sustained HR in the 160-180 bpm range which prompted ER visit. She was given adenosine x 2 which eventually terminated the arrhythmia. She presented electively and is now status post uncomplicated SVT radiofrequency ablation. The patient was observed overnight without event and was discharged home the following morning with a plan for outpatient follow up.

## 2024-02-26 NOTE — DISCHARGE NOTE PROVIDER - NSDCCPTREATMENT_GEN_ALL_CORE_FT
PRINCIPAL PROCEDURE  Procedure: Radiofrequency ablation, arrhythmogenic focus, for supraventricular tachycardia  Findings and Treatment: - Bruising at the groin, sometimes extending down the leg, and/or a small lump under the skin at the groin access site is normal and will resolve within 2 – 3 weeks.   - Occasional skipped beats or palpitations that last for a few beats are common and generally resolve within 1-2 months.   - You may walk and take stairs at a regular pace.   - Do not perform any exercise more strenuous than walking for 1 week.   - Do not strain or lift heavy objects for 1 week.  - You may shower the day after the procedure.  - Do not soak in water (such as tub baths, hot tubs, swimming, etc.) for 1 week.   - You may resume all other activities the day after the procedure.  Call your doctor if:   - you notice bleeding, redness, drainage, swelling, increased tenderness or a hot sensation around the catheter insertion site.   - your temperature is greater than 100 degrees F for more than 24 hours.  - your rapid heart rhythm returns.  - you have any questions or concerns regarding the procedure.  If significant bleeding and/or a large lump (the size of a golf ball or bigger) occurs:  - Lie flat and apply continuous direct pressure just above the puncture site for at least 10 minutes  - If the issue resolves, notify your physician immediately.    - If the bleeding cannot be controlled, please seek immediate medical attention.  If you experience increased difficulty breathing or chest pain, or if you faint or have dizzy spells, please seek immediate medical attention.

## 2024-02-26 NOTE — H&P PST ADULT - ASSESSMENT
28 year old female, active smoker, with Chiari 1 malformation, Bipolar disorder, ADD, polysubstance use (ETOH, marijuana, cocaine), seizures, and adenosine sensitive paroxysmal SVT. She has a long standing history of palpitation, and had a recent episode of sustained HR in the 160-180 bpm range which prompted ER visit. She was given adenosine x 2 which eventually terminated the arrhythmia. She presents today for elective radiofrequency ablation of SVT.     Confirms NPO > 8 hrs.   - iv heplock  - stat labs, ECG  - pregnancy test  - consent lesa/ MD

## 2024-02-26 NOTE — DISCHARGE NOTE PROVIDER - NSDCMRMEDTOKEN_GEN_ALL_CORE_FT
apixaban 2.5 mg oral tablet: 1 tab(s) orally 2 times a day  carvedilol 3.125 mg oral tablet: 1 tab(s) orally every 12 hours

## 2024-02-26 NOTE — H&P PST ADULT - HISTORY OF PRESENT ILLNESS
28 year old female with  28 year old female, active smoker, with Chiari 1 malformation, Bipolar disorder, ADD, polysubstance use (ETOH, marijuana, cocaine), seizures, and adenosine sensitive paroxysmal SVT. She has a long standing history of palpitation, and had a recent episode of sustained HR in the 160-180 bpm range which prompted ER visit. She was given adenosine x 2 which eventually terminated the arrhythmia. She presents today for elective radiofrequency ablation of SVT.     Cardiology summary:   TTE: normal LVEF, no significant structural disease   EST: negative for ischemia

## 2024-02-27 ENCOUNTER — TRANSCRIPTION ENCOUNTER (OUTPATIENT)
Age: 29
End: 2024-02-27

## 2024-02-27 VITALS
SYSTOLIC BLOOD PRESSURE: 110 MMHG | RESPIRATION RATE: 18 BRPM | OXYGEN SATURATION: 96 % | TEMPERATURE: 98 F | DIASTOLIC BLOOD PRESSURE: 62 MMHG | HEART RATE: 70 BPM

## 2024-02-27 LAB
ANION GAP SERPL CALC-SCNC: 14 MMOL/L — SIGNIFICANT CHANGE UP (ref 5–17)
BUN SERPL-MCNC: 8.2 MG/DL — SIGNIFICANT CHANGE UP (ref 8–20)
CALCIUM SERPL-MCNC: 8.9 MG/DL — SIGNIFICANT CHANGE UP (ref 8.4–10.5)
CHLORIDE SERPL-SCNC: 107 MMOL/L — SIGNIFICANT CHANGE UP (ref 96–108)
CO2 SERPL-SCNC: 20 MMOL/L — LOW (ref 22–29)
CREAT SERPL-MCNC: 0.65 MG/DL — SIGNIFICANT CHANGE UP (ref 0.5–1.3)
EGFR: 122 ML/MIN/1.73M2 — SIGNIFICANT CHANGE UP
GLUCOSE SERPL-MCNC: 127 MG/DL — HIGH (ref 70–99)
HCT VFR BLD CALC: 36.6 % — SIGNIFICANT CHANGE UP (ref 34.5–45)
HGB BLD-MCNC: 12.6 G/DL — SIGNIFICANT CHANGE UP (ref 11.5–15.5)
MAGNESIUM SERPL-MCNC: 1.9 MG/DL — SIGNIFICANT CHANGE UP (ref 1.6–2.6)
MCHC RBC-ENTMCNC: 30.2 PG — SIGNIFICANT CHANGE UP (ref 27–34)
MCHC RBC-ENTMCNC: 34.4 GM/DL — SIGNIFICANT CHANGE UP (ref 32–36)
MCV RBC AUTO: 87.8 FL — SIGNIFICANT CHANGE UP (ref 80–100)
PLATELET # BLD AUTO: 201 K/UL — SIGNIFICANT CHANGE UP (ref 150–400)
POTASSIUM SERPL-MCNC: 4 MMOL/L — SIGNIFICANT CHANGE UP (ref 3.5–5.3)
POTASSIUM SERPL-SCNC: 4 MMOL/L — SIGNIFICANT CHANGE UP (ref 3.5–5.3)
RBC # BLD: 4.17 M/UL — SIGNIFICANT CHANGE UP (ref 3.8–5.2)
RBC # FLD: 13.2 % — SIGNIFICANT CHANGE UP (ref 10.3–14.5)
SODIUM SERPL-SCNC: 141 MMOL/L — SIGNIFICANT CHANGE UP (ref 135–145)
WBC # BLD: 14.36 K/UL — HIGH (ref 3.8–10.5)
WBC # FLD AUTO: 14.36 K/UL — HIGH (ref 3.8–10.5)

## 2024-02-27 PROCEDURE — 93462 L HRT CATH TRNSPTL PUNCTURE: CPT

## 2024-02-27 PROCEDURE — 85730 THROMBOPLASTIN TIME PARTIAL: CPT

## 2024-02-27 PROCEDURE — 93010 ELECTROCARDIOGRAM REPORT: CPT

## 2024-02-27 PROCEDURE — C1894: CPT

## 2024-02-27 PROCEDURE — 93662 INTRACARDIAC ECG (ICE): CPT

## 2024-02-27 PROCEDURE — 85027 COMPLETE CBC AUTOMATED: CPT

## 2024-02-27 PROCEDURE — C1887: CPT

## 2024-02-27 PROCEDURE — 80048 BASIC METABOLIC PNL TOTAL CA: CPT

## 2024-02-27 PROCEDURE — 86900 BLOOD TYPING SEROLOGIC ABO: CPT

## 2024-02-27 PROCEDURE — C1730: CPT

## 2024-02-27 PROCEDURE — 85610 PROTHROMBIN TIME: CPT

## 2024-02-27 PROCEDURE — 93005 ELECTROCARDIOGRAM TRACING: CPT

## 2024-02-27 PROCEDURE — C1766: CPT

## 2024-02-27 PROCEDURE — 36415 COLL VENOUS BLD VENIPUNCTURE: CPT

## 2024-02-27 PROCEDURE — 83735 ASSAY OF MAGNESIUM: CPT

## 2024-02-27 PROCEDURE — C1732: CPT

## 2024-02-27 PROCEDURE — 86850 RBC ANTIBODY SCREEN: CPT

## 2024-02-27 PROCEDURE — 93653 COMPRE EP EVAL TX SVT: CPT

## 2024-02-27 PROCEDURE — C1759: CPT

## 2024-02-27 PROCEDURE — 86901 BLOOD TYPING SEROLOGIC RH(D): CPT

## 2024-02-27 PROCEDURE — 93623 PRGRMD STIMJ&PACG IV RX NFS: CPT

## 2024-02-27 PROCEDURE — 84703 CHORIONIC GONADOTROPIN ASSAY: CPT

## 2024-02-27 RX ORDER — MAGNESIUM SULFATE 500 MG/ML
2 VIAL (ML) INJECTION ONCE
Refills: 0 | Status: COMPLETED | OUTPATIENT
Start: 2024-02-27 | End: 2024-02-27

## 2024-02-27 RX ORDER — APIXABAN 2.5 MG/1
1 TABLET, FILM COATED ORAL
Qty: 60 | Refills: 0
Start: 2024-02-27 | End: 2024-03-27

## 2024-02-27 RX ORDER — CARVEDILOL PHOSPHATE 80 MG/1
1 CAPSULE, EXTENDED RELEASE ORAL
Qty: 60 | Refills: 0
Start: 2024-02-27 | End: 2024-03-27

## 2024-02-27 RX ADMIN — Medication 1 PATCH: at 08:33

## 2024-02-27 RX ADMIN — APIXABAN 2.5 MILLIGRAM(S): 2.5 TABLET, FILM COATED ORAL at 06:05

## 2024-02-27 RX ADMIN — Medication 50 GRAM(S): at 08:18

## 2024-02-27 RX ADMIN — BENZOCAINE AND MENTHOL 1 LOZENGE: 5; 1 LIQUID ORAL at 06:05

## 2024-02-27 RX ADMIN — CARVEDILOL PHOSPHATE 3.12 MILLIGRAM(S): 80 CAPSULE, EXTENDED RELEASE ORAL at 06:05

## 2024-02-27 NOTE — PROGRESS NOTE ADULT - SUBJECTIVE AND OBJECTIVE BOX
Admission Criteria  Please admit the patient to the following service: CARDIOLOGY    Major Criteria:  - Continuous EKG monitoring is required for condition causing arrhythmia (hyperkalemia, etc)  - Significant volume load > 200 ml    Admit to: Cardiology/Telemetry     Patient is being admitted to the inpatient service due to high risk characteristics and need for further management/monitoring and is considered to be at a significantly increased risk of major adverse cardiac and vascular events if discharged.  
PROCEDURE(S): Radiofrequency SVT Ablation     ELECTROPHYSIOLOGIST(S): Umair Boyle MD         COMPLICATIONS:  none        DISPOSITION: observation      CONDITION: stable    Pt doing well s/p radiofrequency SVT ablation via b/l FV access. Denies complaint post procedure.     I/Os: 2546 cc in/ 200 cc out     MEDICATIONS  (STANDING):  apixaban 2.5 milliGRAM(s) Oral two times a day  carvedilol 3.125 milliGRAM(s) Oral every 12 hours    MEDICATIONS  (PRN):  acetaminophen     Tablet .. 650 milliGRAM(s) Oral every 6 hours PRN Mild Pain (1 - 3), Moderate Pain (4 - 6)  ALPRAZolam 0.25 milliGRAM(s) Oral every 6 hours PRN anxiety/insomnia  aluminum hydroxide/magnesium hydroxide/simethicone Suspension 30 milliLiter(s) Oral every 4 hours PRN Dyspepsia  benzocaine/menthol Lozenge 1 Lozenge Oral every 2 hours PRN Sore Throat  oxycodone    5 mG/acetaminophen 325 mG 1 Tablet(s) Oral every 6 hours PRN Severe Pain (7 - 10)    Allergies:  No Known Allergies    VS:   T(C): 36.8 (02-26-24 @ 07:29), Max: 36.8 (02-26-24 @ 07:29)  HR: 86 (02-26-24 @ 07:29) (81 - 86)  BP: 110/60 (02-26-24 @ 07:29) (110/60 - 110/60)  RR: 16 (02-26-24 @ 07:29) (16 - 16)  SpO2: 98% (02-26-24 @ 07:29) (95% - 98%)  Post-procedure VS: /48 HR 99 O2 sat 95% RA RR 16     Physical exam:   awake, alert, no obvious distress  Card: S1/S2, RRR, no m/g/r  Resp: lungs CTA b/l  Abd: S/NT/ND  Groins: hemostatic sutures in place; sites C/D/I; no bleeding, hematoma, erythema, exudate or edema  Ext: no edema; distal pulses intact    ECG: NSR 98 bpm     Assessment:   28 year old female, active smoker, with Chiari 1 malformation, Bipolar disorder, ADD, polysubstance use (ETOH, marijuana, cocaine), seizures, and adenosine sensitive paroxysmal SVT. She has a long standing history of palpitation, and had a recent episode of sustained HR in the 160-180 bpm range which prompted ER visit. She was given adenosine x 2 which eventually terminated the arrhythmia. She presented electively and is now status post uncomplicated SVT radiofrequency ablation (ablation performed @ CS, OS, LA, RA, and slow pathway modification).     Plan:   Bedrest x 4 hours, then OOB with assistance and progress as tolerated.   Groin sutures to be removed by EP service in AM.   Pending groin status, start Eliquis 2.5mg Q12 hrs @ 18:00 for 30 days.    Start Coreg 3.125mg BID   Continue other home medications.   Please encourage incentive spirometry and ambulation once able.  Observation and monitoring on telemetry overnight with anticipated discharge in the AM and outpt follow up in 4-6 weeks.  
  Pt doing well POD #1 s/p radiofrequency SVT ablation via b/l FV access. Pt seen and examined, reports feeling well, c/o throat soreness. Morning labs and telemetry reviewed. Bilateral groin sutures removed without incident.     EKG:  SR @ 84bpm. WV 132ms. QRSD 88ms.   TELE: SR @ 76bpm, no events     MEDICATIONS  (STANDING):  apixaban 2.5 milliGRAM(s) Oral two times a day  carvedilol 3.125 milliGRAM(s) Oral every 12 hours  magnesium sulfate  IVPB 2 Gram(s) IV Intermittent once  nicotine -   7 mG/24Hr(s) Patch 1 Patch Transdermal daily    MEDICATIONS  (PRN):  acetaminophen     Tablet .. 650 milliGRAM(s) Oral every 6 hours PRN Mild Pain (1 - 3), Moderate Pain (4 - 6)  ALPRAZolam 0.25 milliGRAM(s) Oral every 6 hours PRN anxiety/insomnia  aluminum hydroxide/magnesium hydroxide/simethicone Suspension 30 milliLiter(s) Oral every 4 hours PRN Dyspepsia  benzocaine/menthol Lozenge 1 Lozenge Oral every 2 hours PRN Sore Throat  ketorolac   Injectable 15 milliGRAM(s) IV Push once PRN Moderate Pain (4 - 6)  oxycodone    5 mG/acetaminophen 325 mG 1 Tablet(s) Oral every 6 hours PRN Severe Pain (7 - 10)      Allergies  No Known Allergies      PAST MEDICAL & SURGICAL HISTORY:  Chiari I malformation  Bipolar disorder  Paroxysmal SVT (supraventricular tachycardia)  Current smoker  Seizures      Vital Signs Last 24 Hrs  T(C): 36.8 (27 Feb 2024 05:50), Max: 36.9 (26 Feb 2024 21:00)  T(F): 98.2 (27 Feb 2024 05:50), Max: 98.4 (26 Feb 2024 21:00)  HR: 72 (27 Feb 2024 05:50) (72 - 101)  BP: 118/71 (27 Feb 2024 05:50) (98/54 - 124/76)  RR: 18 (27 Feb 2024 05:50) (13 - 18)  SpO2: 98% (27 Feb 2024 05:50) (94% - 100%)    Parameters below as of 27 Feb 2024 05:50  Patient On (Oxygen Delivery Method): room air        Physical Exam:  Constitutional: NAD, AAOx3  Cardiovascular: +S1S2 RRR  Pulmonary: CTA b/l, unlabored  Abd: soft NTND +BS  Groins: C/D/I bilaterally; no bleeding, hematoma, edema. +ecchymosis noted to the right access site.   Extremities: no pedal edema, +distal pulses b/l  Neuro: non focal, SEAY x4    LABS:                        12.6   14.36 )-----------( 201      ( 27 Feb 2024 05:43 )             36.6     02-27    141  |  107  |  8.2  ----------------------------<  127<H>  4.0   |  20.0<L>  |  0.65    Ca    8.9      27 Feb 2024 05:43  Mg     1.9     02-27      PT/INR - ( 26 Feb 2024 06:41 )   PT: 10.0 sec;   INR: 0.90 ratio         PTT - ( 26 Feb 2024 06:41 )  PTT:31.8 sec  Urinalysis Basic - ( 27 Feb 2024 05:43 )    Color: x / Appearance: x / SG: x / pH: x  Gluc: 127 mg/dL / Ketone: x  / Bili: x / Urobili: x   Blood: x / Protein: x / Nitrite: x   Leuk Esterase: x / RBC: x / WBC x   Sq Epi: x / Non Sq Epi: x / Bacteria: x        Assessment:   28 year old female, active smoker, with Chiari 1 malformation, Bipolar disorder, ADD, polysubstance use (ETOH, marijuana, cocaine), seizures, and adenosine sensitive paroxysmal SVT. She has a long standing history of palpitation, and had a recent episode of sustained HR in the 160-180 bpm range which prompted ER visit. She was given adenosine x 2 which eventually terminated the arrhythmia. She presented electively and is now POD #1 status post uncomplicated SVT radiofrequency ablation (ablation performed @ CS, OS, LA, RA, and slow pathway modification).     Plan:   Eliquis 2.5mg every 12 hours x 30 days.  Coreg 3.125mg twice daily.   Continue other home medications.   Access site care and activity limitations reviewed w/ pt.   Outpt f/up with Dr. Boyle in 1-2 months.

## 2024-02-27 NOTE — DISCHARGE NOTE NURSING/CASE MANAGEMENT/SOCIAL WORK - PATIENT PORTAL LINK FT
You can access the FollowMyHealth Patient Portal offered by [facility] by registering at the following website: [email or web address]. By joining [facility]’s FollowMyHealth portal, you will also be able to view your health information using other applications (apps) compatible with our system.

## 2024-02-29 PROBLEM — I47.10 SUPRAVENTRICULAR TACHYCARDIA, UNSPECIFIED: Chronic | Status: ACTIVE | Noted: 2024-02-26

## 2024-02-29 PROBLEM — R56.9 UNSPECIFIED CONVULSIONS: Chronic | Status: ACTIVE | Noted: 2024-02-26

## 2024-02-29 PROBLEM — F17.200 NICOTINE DEPENDENCE, UNSPECIFIED, UNCOMPLICATED: Chronic | Status: ACTIVE | Noted: 2024-02-26

## 2024-03-04 ENCOUNTER — EMERGENCY (EMERGENCY)
Facility: HOSPITAL | Age: 29
LOS: 1 days | Discharge: HOME | End: 2024-03-04
Attending: EMERGENCY MEDICINE
Payer: COMMERCIAL

## 2024-03-04 ENCOUNTER — NON-APPOINTMENT (OUTPATIENT)
Age: 29
End: 2024-03-04

## 2024-03-04 VITALS
DIASTOLIC BLOOD PRESSURE: 80 MMHG | HEART RATE: 89 BPM | RESPIRATION RATE: 16 BRPM | WEIGHT: 201.28 LBS | OXYGEN SATURATION: 88 % | HEIGHT: 64 IN | TEMPERATURE: 98 F | SYSTOLIC BLOOD PRESSURE: 134 MMHG

## 2024-03-04 VITALS — HEART RATE: 74 BPM | OXYGEN SATURATION: 98 %

## 2024-03-04 PROCEDURE — 70450 CT HEAD/BRAIN W/O DYE: CPT | Mod: MC

## 2024-03-04 PROCEDURE — 70450 CT HEAD/BRAIN W/O DYE: CPT | Mod: 26,MC

## 2024-03-04 PROCEDURE — 99284 EMERGENCY DEPT VISIT MOD MDM: CPT

## 2024-03-04 NOTE — ED PROVIDER NOTE - NSICDXPASTMEDICALHX_GEN_ALL_CORE_FT
PAST MEDICAL HISTORY:  Bipolar disorder     Chiari I malformation     Current smoker     Paroxysmal SVT (supraventricular tachycardia)     Seizures

## 2024-03-04 NOTE — ED PROVIDER NOTE - PATIENT PORTAL LINK FT
You can access the FollowMyHealth Patient Portal offered by [facility] by registering at the following website: [email or web address]. By joining [facility]’s FollowMyHealth portal, you will also be able to view your health information using other applications (apps) compatible with our system.

## 2024-03-04 NOTE — ED PROVIDER NOTE - OBJECTIVE STATEMENT
28-year-old female status post recent ablation for SVT, currently on Eliquis for 30 days, pending reevaluation at upcoming postop appointment, presents with minor head injury approximately 90 minutes prior to presentation.  Patient states she was getting something out of the refrigerator when she tripped and picked her head up striking her head on the refrigerator door.  Denies loss of consciousness, nausea vomiting, vision changes.

## 2024-03-04 NOTE — ED ADULT TRIAGE NOTE - CHIEF COMPLAINT QUOTE
A&O x 3 on blood thinners c/o headache after hitting head against freezer. Pt had ablation on 02/26/24. -LOC. Dr. Pritchard assessing pt.

## 2024-03-07 PROBLEM — F41.9 ANXIETY: Status: ACTIVE | Noted: 2023-06-29

## 2024-03-07 NOTE — PROCEDURE
[Time Out Completed] : Patient's name, date of birth, procedure and correct site confirmed [Written consent] : and a written consent was obtained prior to the procedure and is detailed in the patient's record [Attending] : Attending [Alcohol] : alcohol [Supine] : Supine [15 mins] : 15 mins [Other: ___] : [unfilled] [___ Year(s)] : [unfilled] year(s) ago [FreeTextEntry4] : PT presents with high stress levels and anxiety, claims to be very easily susceptible to stress, easily angered to the point of spinning with dizziness, with associated SOB and palpitations, 'runs in the family', hair loss that she attributes to her stress levels. PT condition is aggravated by work stressors and is planning a wedding which contributes to her discomfort. PT reports no good outlets for her stress and poor coping mechanisms.  [FreeTextEntry6] : generalized [de-identified] : 1. Hot/cold/fever/chills: constantly hot 2. Sweat: sweats very easily, profusely, sleeps with the window open  3. Head/face: sinus problems, freq pounding HA daily w/o coffee, freq earaches and  R sided tinnitus, hair loss related to stress levels, thinning of previously very full hair, poor hearing, glasses for distance,  4. Pain (OLDCARTS): neck, back pain along the belt line following a severe seizure 5. Urine/stool: multiple bowel movements daily, wakes 6AM to go with some urgency, urine is yellow in color, cough will cause some loss of urine 6. Thirst/appetitie/taste: moderate water intake, good diogo, tries to eat clean, pref for cold beverages, craves salty 7. Sleep: 'horrible', falls easily, wakes freq, diff falling back, wakes at least 2-3x to urinate, wakes 'exhausted' 8. Thorax/abdomen: irregular HB, cough PT is a smoker,  9. Gynecology: 2/8/24 last day of mens, 28d/cycle, active bleeding 4-5d depending on side, No BC, painful mens, less clotting recently 10. History: PT scheduled for SVT surgery Mon, seizures, bipolar 2, eggplant allergy. Family medical history: diabetes, HBP, seizures General: PT reports high levels of anxiety on task of ADL, easily susceptible to stress, some bouts of depression, PT has gained 66lbs in 3 years after moving to , reports poor memory Pulse (R): thin, rapid, short Pulse (L): wiry, rapid Tongue: red body, red tip, tooth marked, quivering, inc raised red papillae in LV/GB near the tip, deep HT crack and sm bilateral cracks, very thin sticky white coat along the edges, thin yellow coat bilateral to center line, ext distended sublingual veins   [FreeTextEntry1] : 6 [FreeTextEntry2] : 6 [FreeTextEntry3] : Entered the room: 8:40, initial intake 45min, palpation and needle insertion 15min. Exited the room: 9:40, needle retention 15min. Entered the room: 9:55, needles removed.  Total time spent in the room with the PT: 65min. PT accepted and tolerated the needles well and was able to retain them for the entire treatment.  This was the PT first acupuncture experience, and she was very anxious about the thought of needling, and a very conservative approach was adopted both in number of needles, location and intensity of manipulation. After follow-up and assessment of their reaction to the treatment a more aggressive, comprehensive, approach may be adopted at subsequent encounters.  The provider used single use, disposable, stainless steel, filiform needles that he inserted at critical points on the body determined based on the patient's chief complaint and in accordance with the theories and principles of Traditional Chinese Medicine (TCM) acupuncture practice. The provider located the points according to TCM point location and by palpating for anatomical landmarks. After locating the point, the location is cleaned with an individually packaged, single use, alcohol swab, performed in a single, circular motion on the skin. The provider selected the appropriate needle lengths based on the individual point to be needled, then carefully inserted the needles, and mildly stimulated the needle by various techniques such as rotating the needle or inserting and withdrawing the needle repeatedly depending upon the affect desired, until the sensation of 'de qi' is achieved according to patient report. After all needles have been inserted, repeating the above protocol for each point, he then instructed the patient to rest for a period of time with the needles still inserted. He returned periodically to check on the patient and readjust the needles as necessary. When the provider sees the desired effect is achieved, he removed and disposed of the needles in the sharp's container, applying pressure on the points with a cotton ball to prevent bruising or bleeding. He provided the patient with post procedure instructions, and then charts the procedure. He does not include the patient resting time as part of the time calculation for this service, but only the time he actually spends with the patient.

## 2024-03-07 NOTE — PLAN
[FreeTextEntry1] : Treatment Principles: Smooth LV qi, break LV blood stasis, tonify SP qi, drain damp heat, benefit the HT.

## 2024-03-07 NOTE — ASSESSMENT
[TextEntry] : This is chronic LV qi and blood stasis with underlying SP qi vacuity and damp heat accumulation obstructing the free flow of qi and blood. Heat in the middle bela rising to the upper bela to harass the HT.

## 2024-03-07 NOTE — REASON FOR VISIT
[New Patient] : [unfilled] is a new patient [TextEntry] : 27y/o, female, NPA, presents for an initial acupuncture encounter with a chief complaint of stress and anxiety.

## 2024-04-03 ENCOUNTER — NON-APPOINTMENT (OUTPATIENT)
Age: 29
End: 2024-04-03

## 2024-04-03 ENCOUNTER — APPOINTMENT (OUTPATIENT)
Dept: ELECTROPHYSIOLOGY | Facility: CLINIC | Age: 29
End: 2024-04-03
Payer: COMMERCIAL

## 2024-04-03 VITALS
HEART RATE: 80 BPM | HEIGHT: 64 IN | BODY MASS INDEX: 33.12 KG/M2 | SYSTOLIC BLOOD PRESSURE: 110 MMHG | WEIGHT: 194 LBS | OXYGEN SATURATION: 99 % | DIASTOLIC BLOOD PRESSURE: 64 MMHG

## 2024-04-03 PROCEDURE — 99214 OFFICE O/P EST MOD 30 MIN: CPT | Mod: 25

## 2024-04-03 PROCEDURE — 93000 ELECTROCARDIOGRAM COMPLETE: CPT | Mod: 59

## 2024-04-03 RX ORDER — NYSTATIN AND TRIAMCINOLONE ACETONIDE 100000; 1 [USP'U]/G; MG/G
100000-0.1 OINTMENT TOPICAL TWICE DAILY
Qty: 1 | Refills: 1 | Status: DISCONTINUED | COMMUNITY
Start: 2023-11-07 | End: 2024-04-03

## 2024-04-03 RX ORDER — NAPROXEN SODIUM 500 MG/1
500 TABLET, FILM COATED, EXTENDED RELEASE ORAL DAILY
Qty: 60 | Refills: 1 | Status: DISCONTINUED | COMMUNITY
Start: 2023-08-21 | End: 2024-04-03

## 2024-04-03 RX ORDER — BREMELANOTIDE 1.75 MG/.3ML
1.75 INJECTION SUBCUTANEOUS
Qty: 4 | Refills: 1 | Status: DISCONTINUED | COMMUNITY
Start: 2023-12-04 | End: 2024-04-03

## 2024-04-03 RX ORDER — CARVEDILOL 3.12 MG/1
3.12 TABLET, FILM COATED ORAL TWICE DAILY
Refills: 0 | Status: DISCONTINUED | COMMUNITY

## 2024-04-03 RX ORDER — ONDANSETRON 4 MG/1
4 TABLET, ORALLY DISINTEGRATING ORAL
Qty: 32 | Refills: 2 | Status: DISCONTINUED | COMMUNITY
Start: 2023-12-13 | End: 2024-04-03

## 2024-04-03 RX ORDER — APIXABAN 2.5 MG/1
2.5 TABLET, FILM COATED ORAL TWICE DAILY
Refills: 0 | Status: DISCONTINUED | COMMUNITY

## 2024-04-10 ENCOUNTER — NON-APPOINTMENT (OUTPATIENT)
Age: 29
End: 2024-04-10

## 2024-04-10 NOTE — REVIEW OF SYSTEMS
[Negative] : Psychiatric [SOB] : no shortness of breath [Dyspnea on exertion] : not dyspnea during exertion [Chest Discomfort] : no chest discomfort [Lower Ext Edema] : no extremity edema [Leg Claudication] : no intermittent leg claudication [Palpitations] : no palpitations [Orthopnea] : no orthopnea [PND] : no PND [Syncope] : no syncope [FreeTextEntry5] : regular rhythm

## 2024-04-10 NOTE — HISTORY OF PRESENT ILLNESS
[FreeTextEntry1] : The patient is a 28-year-old female with a history of symptomatic SVT. The patient presented to the ER last year with palpitations with HR approximately between 160-180 bpm. She was given adenosine x2 which eventually terminated the arrhythmia. She reports prior history of self-limiting palpitations. TTE revealed normal LVEF and no significant structural disease. An exercise stress test was also done which was negative for ischemia. She wore an out-patient event monitor which revealed multiple episodes of SVT (19 events, no clearly visible P waves) with offset into sinus rhythm and EP study +/- ablation was planned.  On 2/26/24 underwent a full electrophysiology study was performed including programmed stimulation from the atrium and the right ventricle. (with and without Isuprel). Measurements are documented in the table below. The patient repeatedly went into a narrow complex tachycardia with cycle length of 390 msec. Atrial activation was concentric with a septal VA time of 180 msec. Attempts at overdrive pacing repeatedy terminated the tachycardia generally without atrial activation during pacing. Tachycardia was typically induced by PVCs spontaneously or ventricular pacing. AV dissociation was noted. Overdrive pacing when it did not terminate the tachycardia yielded a VAV response. PPI -TCL was 120 msec. 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 [employer]fm Carto 3D mapping system was used to guide mapping and ablation. BiosDynamic Yield ST-SF catheter was used for ablation/mapping. Mapping was additionally performed with DecaNav and OctaRay multipolar catheters.  Initially earliest site of atrial activation was mapped which was within the proximal coronary sinus. Ablation was performed at this site but was ineffective. The patient continued to have frequent spontaneous SVT. Additional IV access was obtained for intracardiac echo and an 11F sheath inserted in the right femoral vein. Trans-septal puncture was performed using an Agilis sheath and Versacross wire under ICE guidance. Before the trans-septal puncture IV heparin bolus was given and an infusion was started. Earliest activation was mapped within the left atrium and ablation was performed at this site. However, the patient continued to have incessant SVT.    Next right sided slow pathway was targetted for ablation. A "His cloud" was created to locate the site of the AV node/His bundle region. Several radiofrequency lesions were delivered in the slow pathway region, while constantly monitoring temperature, impedance, ECG and intracardiac electrograms. Junctional beats were seen during ablation. Initially the SVT became non-inducible, and No VA conduction was seen. However, after a few minutes the spontaneous SVT recurred. Additional ablation lesions were delivered in the slow pathway region in close proximity to the His-bundle and the anterior lip of the coronary sinus. Similar effect was seen transiently - lack of inducibility and loss of VA conduction briefly and then recurrence.  Next the left sided slow pathway was targetted. No junctional beats were seen when ablation was performed at this site. Again the tachycardia remained inducible.    A final attempt was made to remap the earliest site of atrial activation within the coronary sinus. This site was slightly removed from the previously mapped site of earliest activation (closer to the origin of the MCV). Ablation was performed at this site following which the tachycardia became non-inducible and the retrograde VA conduction was lost as well.  No pericardial effusion was seen on ICE at the end of the procedure.    On 3/4/24 she hit her head on the refridgerator while on Eliquis therapy. Presented to the ED and CTH unremarkable. Otherwise tolerated Eliquis well without bleeding event.   Presents today for follow-up, reports one isolated event where she felt one strong heartbeat ~ 1-month ago, otherwise denies palpitations, dizziness, SOB or chest pain.    Currently on Coreg 3.15mg po q12hr and Eliqis 2.5mg po q12hr  ECG 4/3/24: SR with short Marilou 108

## 2024-04-10 NOTE — DISCUSSION/SUMMARY
[EKG obtained to assist in diagnosis and management of assessed problem(s)] : EKG obtained to assist in diagnosis and management of assessed problem(s) [FreeTextEntry1] : The patient is a 28-year-old female with a history of symptomatic SVT. The patient presented to the ER last year with palpitations with HR approximately between 160-180 bpm. She was given adenosine x2 which eventually terminated the arrhythmia. She reports prior history of self-limiting palpitations. TTE revealed normal LVEF and no significant structural disease. An exercise stress test was also done which was negative for ischemia. She wore an out-patient event monitor which revealed multiple episodes of SVT (19 events, no clearly visible P waves) with offset into sinus rhythm and EP study +/- ablation was planned.  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 This site was slightly removed from the previously mapped site of earliest activation (closer to the origin of the MCV). Ablation was performed at this site following which the tachycardia became non-inducible and the retrograde VA conduction was lost as well.  On 3/4/24 she hit her head on the refrigerator while on Eliquis therapy. Presented to the ED and CTH unremarkable. Otherwise tolerated Eliquis well without bleeding event.   Presents today for follow-up, reports one isolated event where she felt one strong heartbeat ~ 1-month ago, otherwise denies palpitations, dizziness, SOB or chest pain.    Currently on Coreg 3.15mg po q12hr and Eliqis 2.5mg po q12hr  ECG 4/3/24: SR with short Marilou 108  Recommendations: -Discontinue Coreg -Discontinue Eliquis -Perform 2-week Holter -Follow-up visit with Dr. Boyle in 3 months -Instructed to report any recurrent symptomatology -Instructed to report to ED with any severe palpitations, SOB, dizziness or chest pain

## 2024-04-10 NOTE — PHYSICAL EXAM
[No Respiratory Distress] : no respiratory distress  [Normal Venous Pressure] : normal venous pressure [No Edema] : no edema [Alert and Oriented] : alert and oriented [de-identified] : Regular kathym

## 2024-04-10 NOTE — END OF VISIT
[FreeTextEntry3] : I, Dr. Boyle, personally performed the evaluation and management (E/M) services for this new patient. That E/M includes conducting the clinically appropriate initial history &/or exam, assessing all conditions, and establishing the plan of care. Today, my assistant, Florence Bernal NP, was here to observe my evaluation and management service for this patient & follow plan of care established by me going forward.

## 2024-04-15 ENCOUNTER — APPOINTMENT (OUTPATIENT)
Dept: DERMATOLOGY | Facility: CLINIC | Age: 29
End: 2024-04-15
Payer: COMMERCIAL

## 2024-04-15 PROCEDURE — 99214 OFFICE O/P EST MOD 30 MIN: CPT

## 2024-04-25 ENCOUNTER — APPOINTMENT (OUTPATIENT)
Dept: OBGYN | Facility: CLINIC | Age: 29
End: 2024-04-25
Payer: COMMERCIAL

## 2024-04-25 PROCEDURE — 97810 ACUP 1/> WO ESTIM 1ST 15 MIN: CPT

## 2024-04-25 PROCEDURE — 97811 ACUP 1/> W/O ESTIM EA ADD 15: CPT

## 2024-04-25 NOTE — PROCEDURE
[Time Out Completed] : Patient's name, date of birth, procedure and correct site confirmed [Written consent] : and a written consent was obtained prior to the procedure and is detailed in the patient's record [Attending] : Attending [Pain] : pain [Alcohol] : alcohol [Lateral Recumbent] : Lateral Recumbent [Right] : Laterality: right [Other: ___ mins] : [unfilled] mins [No complications] : No complications [FreeTextEntry4] : PT presents with acute R neck and shoulder pain, onset is 5 days, caused on simple cervical rotation to the R side PT reports feeling an immediate tightening of the area, pain is located in levator extending superiorly to the trap and into the suboccipital muscles, pain is dull and achy, with intermittent sharp pain, ROM on cervical rotation to the affected side is impeded and very tight, PT denies any nerupathy of the R arm/hand. Aggravated by movement and positional while sleeping, affecting ADL in decreased sleep quality leading to a feeling of exhaustion, discomfort on movement affecting work.  [FreeTextEntry6] : R sided neck and shoulder [FreeTextEntry7] : dull, achy, intermittent shooting [de-identified] : Pulse (R): thin, rapid, short Pulse (L): wiry, rapid Tongue: red body, red tip, tooth marked, quivering, inc raised red papillae in LV/GB near the tip, deep HT crack and sm bilateral cracks, very thin sticky white coat along the edges, thin yellow coat bilateral to center line, ext distended sublingual veins. [FreeTextEntry2] : 7 [FreeTextEntry1] : 7 [FreeTextEntry3] : Entered the room: 10:00, initial intake 15min, palpation and needle insertion 15min. Exited the room: 10:30, needle retention 20min. Entered the room: 10:50, needles removed.  Total time spent in the room with the PT: 35min. PT accepted and tolerated the needles well and was able to retain them for the entire treatment.  The provider used single use, disposable, stainless steel, filiform needles that he inserted at critical points on the body determined based on the patient's chief complaint and in accordance with the theories and principles of Traditional Chinese Medicine (TCM) acupuncture practice. The provider located the points according to TCM point location and by palpating for anatomical landmarks. After locating the point, the location is cleaned with an individually packaged, single use, alcohol swab, performed in a single, circular motion on the skin. The provider selected the appropriate needle lengths based on the individual point to be needled, then carefully inserted the needles, and mildly stimulated the needle by various techniques such as rotating the needle or inserting and withdrawing the needle repeatedly depending upon the affect desired, until the sensation of 'de qi' is achieved according to patient report. After all needles have been inserted, repeating the above protocol for each point, he then instructed the patient to rest for a period of time with the needles still inserted. He returned periodically to check on the patient and readjust the needles as necessary. When the provider sees the desired effect is achieved, he removed and disposed of the needles in the sharp's container, applying pressure on the points with a cotton ball to prevent bruising or bleeding. He provided the patient with post procedure instructions, and then charts the procedure. He does not include the patient resting time as part of the time calculation for this service, but only the time he actually spends with the patient.

## 2024-04-25 NOTE — REASON FOR VISIT
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

## 2024-04-25 NOTE — PLAN
[FreeTextEntry1] : Treatment Principles: Break stasis, move local qi and blood, relieve pain, restore function.

## 2024-05-15 ENCOUNTER — APPOINTMENT (OUTPATIENT)
Dept: OBGYN | Facility: CLINIC | Age: 29
End: 2024-05-15
Payer: COMMERCIAL

## 2024-05-15 ENCOUNTER — NON-APPOINTMENT (OUTPATIENT)
Age: 29
End: 2024-05-15

## 2024-05-15 VITALS
BODY MASS INDEX: 33.83 KG/M2 | DIASTOLIC BLOOD PRESSURE: 62 MMHG | WEIGHT: 198.13 LBS | SYSTOLIC BLOOD PRESSURE: 112 MMHG | HEIGHT: 64 IN

## 2024-05-15 DIAGNOSIS — F31.9 BIPOLAR DISORDER, UNSPECIFIED: ICD-10-CM

## 2024-05-15 DIAGNOSIS — Z01.419 ENCOUNTER FOR GYNECOLOGICAL EXAMINATION (GENERAL) (ROUTINE) W/OUT ABNORMAL FINDINGS: ICD-10-CM

## 2024-05-15 PROCEDURE — 99459 PELVIC EXAMINATION: CPT

## 2024-05-15 PROCEDURE — 99395 PREV VISIT EST AGE 18-39: CPT | Mod: 25

## 2024-05-15 RX ORDER — GLUCOSAMINE/MSM/CHONDROIT SULF 500-166.6
TABLET ORAL
Refills: 0 | Status: ACTIVE | COMMUNITY

## 2024-05-15 RX ORDER — GLUC/MSM/COLGN2/HYAL/ANTIARTH3 375-375-20
TABLET ORAL
Refills: 0 | Status: ACTIVE | COMMUNITY

## 2024-05-15 RX ORDER — ZINC SULFATE 50(220)MG
CAPSULE ORAL
Refills: 0 | Status: ACTIVE | COMMUNITY

## 2024-05-15 RX ORDER — UBIDECARENONE/VIT E ACET 100MG-5
CAPSULE ORAL
Refills: 0 | Status: ACTIVE | COMMUNITY

## 2024-05-15 NOTE — REASON FOR VISIT
[Initial] : an initial consultation for [FreeTextEntry2] : pt wants loss weight for wedding , establish pt care

## 2024-05-15 NOTE — PHYSICAL EXAM
[Chaperone Present] : A chaperone was present in the examining room during all aspects of the physical examination [20126] : A chaperone was present during the pelvic exam. [Appropriately responsive] : appropriately responsive [Alert] : alert [No Acute Distress] : no acute distress [Soft] : soft [Non-tender] : non-tender [Non-distended] : non-distended [No HSM] : No HSM [No Lesions] : no lesions [No Mass] : no mass [Oriented x3] : oriented x3 [Examination Of The Breasts] : a normal appearance [No Masses] : no breast masses were palpable [Labia Majora] : normal [Labia Minora] : normal [Normal] : normal [Uterine Adnexae] : normal [FreeTextEntry8] : negative
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
## 2024-05-16 LAB
24R-OH-CALCIDIOL SERPL-MCNC: 31 PG/ML
ALT SERPL-CCNC: 23 U/L
ANION GAP SERPL CALC-SCNC: 17 MMOL/L
AST SERPL-CCNC: 20 U/L
CHLORIDE SERPL-SCNC: 101 MMOL/L
CHOLEST SERPL-MCNC: 204 MG/DL
CO2 SERPL-SCNC: 21 MMOL/L
CRP SERPL-MCNC: 3 MG/L
ESTIMATED AVERAGE GLUCOSE: 94 MG/DL
FSH SERPL-MCNC: 3 IU/L
HBA1C MFR BLD HPLC: 4.9 %
HDLC SERPL-MCNC: 49 MG/DL
INSULIN SERPL-MCNC: 11.9 UU/ML
LDLC SERPL DIRECT ASSAY-MCNC: 133 MG/DL
LH SERPL-ACNC: 7.9 IU/L
POTASSIUM SERPL-SCNC: 4 MMOL/L
PROGEST SERPL-MCNC: 8.5 NG/ML
PROLACTIN SERPL-MCNC: 12.6 NG/ML
SODIUM SERPL-SCNC: 139 MMOL/L
T3 SERPL-MCNC: 118 NG/DL
TESTOST SERPL-MCNC: 27.9 NG/DL
TSH SERPL-ACNC: 2.22 UIU/ML
VIT B12 SERPL-MCNC: 381 PG/ML

## 2024-05-20 LAB
C TRACH RRNA SPEC QL NAA+PROBE: NOT DETECTED
CYTOLOGY CVX/VAG DOC THIN PREP: NORMAL
HPV HIGH+LOW RISK DNA PNL CVX: NOT DETECTED
N GONORRHOEA RRNA SPEC QL NAA+PROBE: NOT DETECTED
SOURCE TP AMPLIFICATION: NORMAL

## 2024-05-22 ENCOUNTER — APPOINTMENT (OUTPATIENT)
Dept: OBGYN | Facility: CLINIC | Age: 29
End: 2024-05-22
Payer: COMMERCIAL

## 2024-05-22 VITALS
HEIGHT: 64 IN | WEIGHT: 196 LBS | BODY MASS INDEX: 33.46 KG/M2 | DIASTOLIC BLOOD PRESSURE: 82 MMHG | SYSTOLIC BLOOD PRESSURE: 124 MMHG

## 2024-05-22 DIAGNOSIS — E66.9 OBESITY, UNSPECIFIED: ICD-10-CM

## 2024-05-22 DIAGNOSIS — E78.00 PURE HYPERCHOLESTEROLEMIA, UNSPECIFIED: ICD-10-CM

## 2024-05-22 DIAGNOSIS — I47.10 SUPRAVENTRICULAR TACHYCARDIA, UNSPECIFIED: ICD-10-CM

## 2024-05-22 LAB — ESTROGEN SERPL-MCNC: 218 PG/ML

## 2024-05-22 PROCEDURE — 99214 OFFICE O/P EST MOD 30 MIN: CPT

## 2024-05-22 NOTE — HISTORY OF PRESENT ILLNESS
[Improved Health] : Improved health [Quality of Life] : Quality of life [FreeTextEntry2] : 128 [FreeTextEntry3] : 196 [FreeTextEntry1] : JIGAR HAUSER is a 29 year year old who comes in for a dietary/weight loss consultation. Past medical history is significant for obesity, cardiac arrythmia s/p EP, hypercholesterolemia . Patient's vital signs were reviewed. Her reported height is 5'4. Today's weight is 196 . BMI is 33.6. A detailed weight history was obtained.   The patient has tried numerous weight loss programs including self-directed and physician supervised diets and self-directed exercise programs. She has lost greater than 10 pounds on more than one occasion, however, has experienced weight regain despite her best efforts with healthy diet and exercise.   LABS - DATE:  HgA1C:    DIETING HISTORY  Prior Diets: weight watchers.  History of Bariatric Surgery: YES   If yes, what Bariatric Surgery: non History of Weight Loss Medications: None  If yes, what Medications:  Complications & Side Effects of Anti-Obesity Medications:    LIFESTYLE:  Sleep: 7 hours, snoring  Alcohol: currently on weekends, but trying to stop Exercise: walking Occupation: Medical assistant  NUTRITION:  Breakfast : 9am premier protein shakes Lunch: 1130-1pm Salad, sandwich- cold cuts, hummus/pretzels Dinner: 7-8pm chicken, fish, meatloaf, vegetables, starch/ carb Snacks: apple strudel, keto ice cream, chocolate, chips Drinks:  water, coffee -black.  I have instructed the patient to follow a 1200 calories meal plan emphasizing low saturated fat sources with moderate amount of sodium as well. Information on fast food eating was supplied and additional information on low fat eating was also supplied. Suggestions of meals and snacks were discussed with the patient in great detail. We reviewed the efforts of weight reduction identifying 3500 calories in pound of body fat and the need to gradually and sloqly chip away at this number on a long term basis for weight reduction. It is a lifestyle change. WE discussed the need to reduce calories for what her current patterns are and to hopefully increase physical activity as well. We discussed menu selection as well as food preparation techniques.  Recommend the patient to reduce calories by 500 daily to support a weight loss of 1 pound a week. This translated into a 1200 calorie plan. I encouraged the patient to keep food records in order to better track calorie consumed. I recommended low fat selections and especially those that are lower in saturated fats. Emphasis would be placed on monitoring portions of meat and having more moderate snacks between meal as well.    Pt educated on eating 4-6 small meals per day, that are high in protein for hunger regulation. Pt educated on cutting out high-sugar content foods sabotaging wt loss.  Her physical activity is minimal at this time. Recommendations given to the patient to increase the intensity and the duration of her physical activity with the goal of 30mins five times a week. to starty with brisk walking.   Will try Course of :  Nutrition Counseling  Discussed options- Plan for Wegovy,  Stressed physical activity Labs reviewed with patient

## 2024-05-23 ENCOUNTER — NON-APPOINTMENT (OUTPATIENT)
Age: 29
End: 2024-05-23

## 2024-05-23 RX ORDER — SEMAGLUTIDE 0.25 MG/.5ML
0.25 INJECTION, SOLUTION SUBCUTANEOUS
Qty: 1 | Refills: 1 | Status: ACTIVE | COMMUNITY
Start: 2024-05-22

## 2024-05-28 DIAGNOSIS — Z32.01 ENCOUNTER FOR PREGNANCY TEST, RESULT POSITIVE: ICD-10-CM

## 2024-05-28 LAB — DHEA-SULFATE, SERUM: 126 UG/DL

## 2024-05-31 DIAGNOSIS — R10.9 UNSPECIFIED ABDOMINAL PAIN: ICD-10-CM

## 2024-05-31 DIAGNOSIS — R11.0 NAUSEA: ICD-10-CM

## 2024-05-31 RX ORDER — ONDANSETRON 4 MG/1
4 TABLET, ORALLY DISINTEGRATING ORAL
Qty: 30 | Refills: 0 | Status: ACTIVE | COMMUNITY
Start: 2024-05-31 | End: 1900-01-01

## 2024-06-06 ENCOUNTER — APPOINTMENT (OUTPATIENT)
Dept: OBGYN | Facility: CLINIC | Age: 29
End: 2024-06-06
Payer: COMMERCIAL

## 2024-06-06 ENCOUNTER — APPOINTMENT (OUTPATIENT)
Age: 29
End: 2024-06-06

## 2024-06-06 VITALS
TEMPERATURE: 98.1 F | DIASTOLIC BLOOD PRESSURE: 74 MMHG | HEART RATE: 98 BPM | OXYGEN SATURATION: 99 % | SYSTOLIC BLOOD PRESSURE: 118 MMHG

## 2024-06-06 DIAGNOSIS — Z33.2 ENCOUNTER FOR ELECTIVE TERMINATION OF PREGNANCY: ICD-10-CM

## 2024-06-06 DIAGNOSIS — Z30.09 ENCOUNTER FOR OTHER GENERAL COUNSELING AND ADVICE ON CONTRACEPTION: ICD-10-CM

## 2024-06-06 PROCEDURE — 99214 OFFICE O/P EST MOD 30 MIN: CPT | Mod: 25

## 2024-06-06 PROCEDURE — S0190: CPT

## 2024-06-06 PROCEDURE — 76817 TRANSVAGINAL US OBSTETRIC: CPT

## 2024-06-06 RX ORDER — NAPROXEN 500 MG/1
500 TABLET ORAL
Qty: 30 | Refills: 0 | Status: ACTIVE | COMMUNITY
Start: 2024-06-06 | End: 1900-01-01

## 2024-06-06 RX ORDER — MIFEPRISTONE 200 MG/1
200 TABLET ORAL
Refills: 0 | Status: COMPLETED | OUTPATIENT
Start: 2024-06-06

## 2024-06-06 RX ORDER — MISOPROSTOL 200 UG/1
200 TABLET ORAL
Qty: 8 | Refills: 0 | Status: ACTIVE | COMMUNITY
Start: 2024-06-06 | End: 1900-01-01

## 2024-06-06 RX ADMIN — Medication 0 MG: at 00:00

## 2024-06-06 NOTE — REASON FOR VISIT
Daily Note     Today's date: 2024  Patient name: Trae Bush  : 1959  MRN: [id number]  Referring provider: Jeff Kevin DO  Dx:   Encounter Diagnosis     ICD-10-CM    1. Right hand weakness  R29.898                      Subjective: Pt reports he purchased flexbars for home, able to lift 5 lbs weight for wrist curls. Able to  2 gallon water jug the other day.       Objective: See treatment diary below      Assessment:   Diminished light touch along SF   II R 35 ( +5)  Lp 8  3pt 8  Active PIP extension IF, RF, SF 20 degrees; full extension with MCP blocked  ECU 4+  Th add 4  Interossei 3+  Goals  STG (2-3 weeks)  1: Pt independent in HEP- met  2: Improve AROM 10-15 degrees- met  3: improve thumb abd/ext 10 degrees- met  4: full PROM-met  5: decrease edema 25%- met    LTG (2-3 weeks)  1: Improve FOTO 10-15 pts- met  2: full active comp fist- met  3: Pt able to manipulate small objects without difficulty or fatigue-met  4: improve sensation to 0.2 g or better- met  5:  strength within 25 lbs of uninvolved side- not met  6: pinch strength within 5 lbs of uninvolved side- not met  Pt achieved her therapy goals with exception of  and pinch strength but she has been showing gradual progress in the return of her functional strength and posture of her hand. Pt willing to continue to work on her strength and will contact us if she is faced with any new issues.     Plan: D/C to HEP     Precautions: DOI 23  HEP: PROM digits, TGE's, digit abd/add, thumb opp, thumb rad/palm abd, towel bunches, putty roll/pinch, foam squeezes      Manuals            MH 10 10           PROM digits 5 5           Ulnar nerve glides 5 5 reassessment             15 30                        Neuro Re-Ed                                       clothespins 4 min blue/black            Grooved pegs 1 board            Putty press Red 3 min            Putty bottle cap Red 3 min             15            Ther Ex              Wrist AROM 4# 2x10            fingerweb Blue 30x            flexbar sup/pron  Green 30x            flexbar towel twist Green 30x            digiweb  Yellow 30x             20            Ther Activity                                       Gait Training                                       Modalities                                                                                   [Initial] : an initial consultation for

## 2024-06-06 NOTE — PROCEDURE
[Transvaginal Ultrasound] : transvaginal ultrasound [FreeTextEntry9] : Confirm IUP [FreeTextEntry4] : Transabdominal US performed to confirm IUP: gestational sac seen, but no yolk sac. Transvaginal US performed: gestational sac with yolk sac seen c/w dates. [Transvaginal OB Sonogram] : Transvaginal OB Sonogram [Intrauterine Pregnancy] : intrauterine pregnancy [Yolk Sac] : yolk sac present [Date: ___] : Date: [unfilled] [Current GA by Sonogram: ___ (wks)] : Current GA by Sonogram: [unfilled]Uwks [___ day(s)] : [unfilled] days [Transvaginal OB Sonogram WNL] : Transvaginal OB Sonogram WNL

## 2024-06-06 NOTE — HISTORY OF PRESENT ILLNESS
[FreeTextEntry1] : 28 yo  at 5w4d by certain LMP  who presents for initial consultation for medication . She was referred by Dr. Recinos (who she works for, had +UPT at work) and her primary OBGYN is Dr. Cullen. She declines contraception. She said she thought she was infertile. Discussed perception of subfertility as a common reason for seeking . Contraception declined.  ObHx: G1 () med TOP @PP 5-6w GynHx: Regular menses. Hx ovarian cyst with rupture not requiring hospitalization or surgery. Last Pap 2024 NILM, HPV negative, GC/CT negative. Chart review reveals possible hx of bicornuate uterus but patient did not disclose - per annual with Dr. Prieto in 2023, discussed implications for impact on fertility/pregnancy complications but patient declined workup. MedHx: SVT s/p ablation s/p discontinuation of Coreg and Eliquis in 2024 by cardiology now wearing cardiac monitor, bipolar disorder. Chart review reveals hx of seizures of Lamictal and Budd-Chiari malformation but patient did not disclose. SurgHx: Cardiac ablation (2024) Meds: Wegovy (prescribed by Dr. Cullen 2w ago for weight loss) Allergies: NKDA SocialHx: Smokes tobacco and marijuana, uses nicotine vape, social alcohol, denies other drug use  Medication    Patient denies medical history of: Liver disease, Renal failure, Chronic adrenal failure, Long term systemic corticosteroid use, IUD in place, current Anticoagulant use of hemorrhagic disorder, Inherited porphyrias, Anemia, Allergy to mifepristone, misoprostol or other prostaglandins     Options for the pregnancy were discussed with the patient, including continuation of pregnancy, medical , dilation and curettage (D&C) in the office under local anesthesia or in the operating room under sedation. They do not desire to continue the pregnancy and are requesting medication . They understand that 2-7% of people who take medication  will need more medication or a surgical procedure to empty the uterus. They understand that medication  is not reversible. Common side effects and additional risks below reviewed.   Common side effects: cramping, bleeding, low grade fever, diarrhea, nausea, vomiting, headache, dizziness, back pain, feeling tired   The risks of medication  including: - Continuing pregnancy, pregnancy tissue or blood clots in the uterus and the need for further medication or surgery - Incomplete  causing heavy bleeding, infection, or both (which may require other testing or treatments such as further medication or surgery) - Bleeding too much or too long which may require further treatment with medication or surgery, or a blood transfusion - Infection in the uterus, which may require further treatment with medication, surgery or antibiotics.  It may also require hospital admission - Allergic reaction to any or all of the medications used    [facility] patient agreement reviewed and signed. A copy was given to the patient, along with the user guide.   1. Patient agrees to undergo surgical  if medication  fails.  2. The patient states they have access to a phone and a nearby hospital if the need for emergency services arises.  3. The patient states they have someone to be available to them if needed at time of medication . 4. The patient is willing and able to follow up to confirm the pregnancy was successfully terminated.   The patient was thoroughly counseled on instructions for medical  and the warning signs of any problems.  They voiced understanding of these warning signs and when to call and were provided with 24-hour contact information for on-call and available physicians. The patient also understands it is their responsibility to bring to the attention of their physician any unusual symptoms following the procedure and to report to follow-up phone calls and/or examinations.   They understand the need to call the office if they have no bleeding in 24 hours after misoprostol as this could mean either the medical  did not work, or something such as an ectopic pregnancy occurred.   The patient is sure of their decision and denies any coercion from family, friends or healthcare providers. The patient had the opportunity to ask questions and all questions were answered.

## 2024-06-06 NOTE — PHYSICAL EXAM
[Chaperone Present] : A chaperone was present in the examining room during all aspects of the physical examination [Appropriately responsive] : appropriately responsive [No Acute Distress] : no acute distress [Soft] : soft [Non-tender] : non-tender [Labia Majora] : normal [Labia Minora] : normal [Normal] :  normal

## 2024-06-06 NOTE — PLAN
[FreeTextEntry1] : 30 yo  at 5w4d by LMP confirmed by 1st TVUS in office today who presents for medication termination. -Took mifepristone (Lot#[id number], Exp: Oct/2027 NDCV [id number]) in office. Instructed to take misoprostol 800mcg vaginally as soon as possible in home. Patient would like to limit amount of time off from work. Provided instructions to repeat dose in 4h if no bleeding. Provided bleeding and fever precautions + resources for emergency line numbers. -Sent naproxen 500mg q12h for analgesia -Has Zofran for antiemetic through Dr. Recinos -Declines contraception -Will follow up in office for 2w for repeat US to confirm completion of    I spent a total of 30 minutes on the encounter evaluating and treating the patient.  Total time does not include the time spent on separately billable procedures performed on this DOS.

## 2024-06-10 ENCOUNTER — APPOINTMENT (OUTPATIENT)
Dept: DERMATOLOGY | Facility: CLINIC | Age: 29
End: 2024-06-10
Payer: COMMERCIAL

## 2024-06-10 PROCEDURE — 99212 OFFICE O/P EST SF 10 MIN: CPT | Mod: 25

## 2024-06-10 PROCEDURE — 95044 PATCH/APPLICATION TESTS: CPT

## 2024-06-12 ENCOUNTER — APPOINTMENT (OUTPATIENT)
Dept: DERMATOLOGY | Facility: CLINIC | Age: 29
End: 2024-06-12
Payer: COMMERCIAL

## 2024-06-12 PROCEDURE — ZZZZZ: CPT

## 2024-06-14 ENCOUNTER — APPOINTMENT (OUTPATIENT)
Dept: DERMATOLOGY | Facility: CLINIC | Age: 29
End: 2024-06-14
Payer: COMMERCIAL

## 2024-06-14 PROCEDURE — 99213 OFFICE O/P EST LOW 20 MIN: CPT

## 2024-06-19 ENCOUNTER — APPOINTMENT (OUTPATIENT)
Dept: OBGYN | Facility: CLINIC | Age: 29
End: 2024-06-19

## 2024-06-20 DIAGNOSIS — Z09 ENCOUNTER FOR FOLLOW-UP EXAMINATION AFTER COMPLETED TREATMENT FOR CONDITIONS OTHER THAN MALIGNANT NEOPLASM: ICD-10-CM

## 2024-06-26 ENCOUNTER — APPOINTMENT (OUTPATIENT)
Dept: OBGYN | Facility: CLINIC | Age: 29
End: 2024-06-26
Payer: COMMERCIAL

## 2024-06-26 VITALS
HEART RATE: 84 BPM | TEMPERATURE: 98.8 F | SYSTOLIC BLOOD PRESSURE: 110 MMHG | DIASTOLIC BLOOD PRESSURE: 70 MMHG | OXYGEN SATURATION: 99 %

## 2024-06-26 DIAGNOSIS — Z98.890 OTHER SPECIFIED POSTPROCEDURAL STATES: ICD-10-CM

## 2024-06-26 PROCEDURE — 76830 TRANSVAGINAL US NON-OB: CPT

## 2024-06-26 PROCEDURE — 99212 OFFICE O/P EST SF 10 MIN: CPT | Mod: 25

## 2024-06-27 ENCOUNTER — APPOINTMENT (OUTPATIENT)
Age: 29
End: 2024-06-27

## 2024-06-27 DIAGNOSIS — M54.2 CERVICALGIA: ICD-10-CM

## 2024-06-27 DIAGNOSIS — M25.519 CERVICALGIA: ICD-10-CM

## 2024-06-27 PROCEDURE — 97810 ACUP 1/> WO ESTIM 1ST 15 MIN: CPT

## 2024-06-27 PROCEDURE — 97811 ACUP 1/> W/O ESTIM EA ADD 15: CPT

## 2024-07-03 ENCOUNTER — APPOINTMENT (OUTPATIENT)
Dept: ELECTROPHYSIOLOGY | Facility: CLINIC | Age: 29
End: 2024-07-03
Payer: COMMERCIAL

## 2024-07-03 ENCOUNTER — NON-APPOINTMENT (OUTPATIENT)
Age: 29
End: 2024-07-03

## 2024-07-03 VITALS
HEIGHT: 64 IN | SYSTOLIC BLOOD PRESSURE: 120 MMHG | BODY MASS INDEX: 32.61 KG/M2 | OXYGEN SATURATION: 99 % | HEART RATE: 107 BPM | WEIGHT: 191 LBS | DIASTOLIC BLOOD PRESSURE: 70 MMHG

## 2024-07-03 DIAGNOSIS — I47.10 SUPRAVENTRICULAR TACHYCARDIA, UNSPECIFIED: ICD-10-CM

## 2024-07-03 PROCEDURE — 99214 OFFICE O/P EST MOD 30 MIN: CPT | Mod: 25

## 2024-07-03 PROCEDURE — 93000 ELECTROCARDIOGRAM COMPLETE: CPT

## 2024-07-12 ENCOUNTER — APPOINTMENT (OUTPATIENT)
Dept: OBGYN | Facility: CLINIC | Age: 29
End: 2024-07-12

## 2024-07-15 ENCOUNTER — NON-APPOINTMENT (OUTPATIENT)
Age: 29
End: 2024-07-15

## 2024-07-26 ENCOUNTER — APPOINTMENT (OUTPATIENT)
Dept: OBGYN | Facility: CLINIC | Age: 29
End: 2024-07-26
Payer: COMMERCIAL

## 2024-07-26 VITALS — BODY MASS INDEX: 31.76 KG/M2 | HEIGHT: 64 IN | WEIGHT: 186 LBS

## 2024-07-26 DIAGNOSIS — E66.9 OBESITY, UNSPECIFIED: ICD-10-CM

## 2024-07-26 DIAGNOSIS — I47.10 SUPRAVENTRICULAR TACHYCARDIA, UNSPECIFIED: ICD-10-CM

## 2024-07-26 PROCEDURE — 99212 OFFICE O/P EST SF 10 MIN: CPT | Mod: 95

## 2024-07-26 RX ORDER — SEMAGLUTIDE 0.5 MG/.5ML
0.5 INJECTION, SOLUTION SUBCUTANEOUS
Qty: 1 | Refills: 1 | Status: ACTIVE | COMMUNITY
Start: 2024-07-26 | End: 1900-01-01

## 2024-07-30 NOTE — HISTORY OF PRESENT ILLNESS
[Home] : at home, [unfilled] , at the time of the visit. [Medical Office: ([facility])___] : at the medical office located in  [Verbal consent obtained from patient] : the patient, [unfilled] [FreeTextEntry1] : weight management followup

## 2024-07-30 NOTE — ASSESSMENT
[FreeTextEntry1] : JIGAR HAUSER  is here for an appointment for a followup of weight loss medications. Recently had a termination- no current birth control at this time.  Telephone call due to technical difficulties.  BARIATRIC SURGERY HISTORY:  OBESITY COMORBIDITIES: none  CURRENT ANTI-OBESITY MEDICATIONS: wegovy 0.25mg OBESITY MEDICATION SIDE EFFECTS:  HISTORY OF WEIGHT LOSS MEDICATIONS:  Complications & Side Effects of Anti-Obesity Medications:    Goal Weight: Patient Current weight 196-->186 BMI: 33.6-->32.7  NUTRITION: decreased appetite Breakfast : 9am premier protein shakes, egg bites, coffee Lunch: 1130-1pm Salad w/ chicken (647 bread), sandwich- cold cuts, hummus/pretzels Dinner: 7-8pm chicken, fish, meatloaf, vegetables, starch/ carb Snacks: limited, handful of pretzel Drinks: water, coffee -black.     Physical activity- walking Medication compliance-  good.  Plan of Care:   Increase diversity of plants/fiber in diet  Increased wegovy 0.5mg  Stressed increased physical activity

## 2024-07-30 NOTE — HISTORY OF PRESENT ILLNESS
[Home] : at home, [unfilled] , at the time of the visit. [Medical Office: ([facility])___] : at the medical office located in  [Verbal consent obtained from patient] : the patient, [unfilled] [FreeTextEntry1] : weight management followup

## 2024-08-22 ENCOUNTER — APPOINTMENT (OUTPATIENT)
Age: 29
End: 2024-08-22
Payer: SELF-PAY

## 2024-08-22 DIAGNOSIS — K31.84 GASTROPARESIS: ICD-10-CM

## 2024-08-22 DIAGNOSIS — J34.89 OTHER SPECIFIED DISORDERS OF NOSE AND NASAL SINUSES: ICD-10-CM

## 2024-08-22 PROCEDURE — 97811 ACUP 1/> W/O ESTIM EA ADD 15: CPT

## 2024-08-22 PROCEDURE — 97810 ACUP 1/> WO ESTIM 1ST 15 MIN: CPT

## 2024-08-22 NOTE — PROCEDURE
[Time Out Completed] : Patient's name, date of birth, procedure and correct site confirmed [Attending] : Attending [Other: ___] : [unfilled] [Alcohol] : alcohol [Supine] : Supine [Other: ___ mins] : [unfilled] mins [FreeTextEntry9] : suborbital  [de-identified] : General: Pulse (R): slippery, rapid Pulse (L): slippery, rapid Tongue: crimson swollen body, tooth marked,  [FreeTextEntry1] : 13 [FreeTextEntry2] : 13 [FreeTextEntry3] : Total time spent in the room with the PT: 35min. PT accepted and tolerated the needles well and was able to retain them for the entire treatment.  The provider used single use, disposable, stainless steel, filiform needles that he inserted at critical points on the body determined based on the patient's chief complaint and in accordance with the theories and principles of Traditional Chinese Medicine (TCM) acupuncture practice. The provider located the points according to TCM point location and by palpating for anatomical landmarks. After locating the point, the location is cleaned with an individually packaged, single use, alcohol swab, performed in a single, circular motion on the skin. The provider selected the appropriate needle lengths based on the individual point to be needled, then carefully inserted the needles, and mildly stimulated the needle by various techniques such as rotating the needle or inserting and withdrawing the needle repeatedly depending upon the affect desired, until the sensation of 'de qi' is achieved according to patient report. After all needles have been inserted, repeating the above protocol for each point, he then instructed the patient to rest for a period of time with the needles still inserted. He returned periodically to check on the patient and readjust the needles as necessary. When the provider sees the desired effect is achieved, he removed and disposed of the needles in the sharp's container, applying pressure on the points with a cotton ball to prevent bruising or bleeding. He provided the patient with post procedure instructions, and then charts the procedure. He does not include the patient resting time as part of the time calculation for this service, but only the time he actually spends with the patient. [FreeTextEntry4] : R sided Bi Tong caused PT some discomfort so the needle was withdrawn and the issue was resolved with no further action deemed nec.

## 2024-08-22 NOTE — ASSESSMENT
[TextEntry] : This is effulgent ST heat, with underlying LV qi stasis generating heat which transfers to the GB channel causing deep source nasal congestion.

## 2024-08-22 NOTE — REASON FOR VISIT
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   PT presents with a secondary complaint of sinus pressure.

## 2024-09-05 ENCOUNTER — NON-APPOINTMENT (OUTPATIENT)
Age: 29
End: 2024-09-05

## 2024-09-11 DIAGNOSIS — R55 SYNCOPE AND COLLAPSE: ICD-10-CM

## 2024-09-11 DIAGNOSIS — F12.90 CANNABIS USE, UNSPECIFIED, UNCOMPLICATED: ICD-10-CM

## 2024-09-20 ENCOUNTER — NON-APPOINTMENT (OUTPATIENT)
Age: 29
End: 2024-09-20

## 2024-09-27 ENCOUNTER — APPOINTMENT (OUTPATIENT)
Dept: OBGYN | Facility: CLINIC | Age: 29
End: 2024-09-27
Payer: COMMERCIAL

## 2024-09-27 VITALS
SYSTOLIC BLOOD PRESSURE: 120 MMHG | BODY MASS INDEX: 31.41 KG/M2 | DIASTOLIC BLOOD PRESSURE: 62 MMHG | HEIGHT: 64 IN | WEIGHT: 184 LBS

## 2024-09-27 DIAGNOSIS — E66.9 OBESITY, UNSPECIFIED: ICD-10-CM

## 2024-09-27 DIAGNOSIS — L40.9 PSORIASIS, UNSPECIFIED: ICD-10-CM

## 2024-09-27 PROCEDURE — 99214 OFFICE O/P EST MOD 30 MIN: CPT

## 2024-09-27 RX ORDER — SEMAGLUTIDE 0.25 MG/.5ML
0.25 INJECTION, SOLUTION SUBCUTANEOUS
Qty: 1 | Refills: 0 | Status: ACTIVE | COMMUNITY
Start: 2024-09-27 | End: 1900-01-01

## 2024-09-27 RX ORDER — PHENTERMINE HYDROCHLORIDE 15 MG/1
15 CAPSULE ORAL
Qty: 30 | Refills: 0 | Status: ACTIVE | COMMUNITY
Start: 2024-09-27 | End: 1900-01-01

## 2024-09-27 NOTE — ASSESSMENT
[FreeTextEntry1] : JIGAR HAUSER  is here for an appointment for a followup of weight loss medications. Patient nauseous, always needing zofran. Lightheadness.   BARIATRIC SURGERY HISTORY:  OBESITY COMORBIDITIES: none  CURRENT ANTI-OBESITY MEDICATIONS: wegovy 0.5mg OBESITY MEDICATION SIDE EFFECTS:  HISTORY OF WEIGHT LOSS MEDICATIONS:  Complications & Side Effects of Anti-Obesity Medications:    Goal Weight: Patient Current weight 196-->186-->184 BMI: 33.6-->32.7-->31.58  NUTRITION: decreased appetite Breakfast : 9am premier protein shakes, egg bites, coffee Lunch: 1130-1pm Salad w/ chicken (647 bread), sandwich- cold cuts, hummus/pretzels, protein bar Dinner: 7-8pm chicken, fish, meatloaf, vegetables, starch/ carb Snacks: limited, handful of pretzels Drinks: water, coffee -black, water   Physical activity- walking Medication compliance-  good.  Plan of Care:  Whole food eating based strategy.  Increase diversity of plants/fiber in diet  To stop wegovy at this time due to increased side effects, dizziness, nausea, GI symptoms tired. To start phentermine

## 2024-10-10 DIAGNOSIS — Z15.09 GENETIC SUSCEPTIBILITY TO MALIGNANT NEOPLASM OF BREAST: ICD-10-CM

## 2024-10-10 DIAGNOSIS — Z15.01 GENETIC SUSCEPTIBILITY TO MALIGNANT NEOPLASM OF BREAST: ICD-10-CM

## 2024-10-11 DIAGNOSIS — B37.31 ACUTE CANDIDIASIS OF VULVA AND VAGINA: ICD-10-CM

## 2024-10-11 RX ORDER — FLUCONAZOLE 150 MG/1
150 TABLET ORAL
Qty: 3 | Refills: 2 | Status: ACTIVE | COMMUNITY
Start: 2024-10-11 | End: 1900-01-01

## 2024-10-30 ENCOUNTER — APPOINTMENT (OUTPATIENT)
Dept: OBGYN | Facility: CLINIC | Age: 29
End: 2024-10-30
Payer: COMMERCIAL

## 2024-10-30 VITALS — BODY MASS INDEX: 31.07 KG/M2 | HEIGHT: 64 IN | WEIGHT: 182 LBS

## 2024-10-30 DIAGNOSIS — E66.9 OBESITY, UNSPECIFIED: ICD-10-CM

## 2024-10-30 PROCEDURE — 99214 OFFICE O/P EST MOD 30 MIN: CPT | Mod: 95

## 2024-10-30 RX ORDER — SEMAGLUTIDE 0.25 MG/.5ML
0.25 INJECTION, SOLUTION SUBCUTANEOUS
Qty: 1 | Refills: 0 | Status: ACTIVE | COMMUNITY
Start: 2024-10-30 | End: 1900-01-01

## 2024-10-30 RX ORDER — PHENTERMINE HYDROCHLORIDE 37.5 MG/1
37.5 CAPSULE ORAL
Qty: 30 | Refills: 0 | Status: ACTIVE | COMMUNITY
Start: 2024-10-30 | End: 1900-01-01

## 2024-11-22 ENCOUNTER — APPOINTMENT (OUTPATIENT)
Dept: OBGYN | Facility: CLINIC | Age: 29
End: 2024-11-22
Payer: COMMERCIAL

## 2024-11-22 VITALS — BODY MASS INDEX: 30.22 KG/M2 | WEIGHT: 177 LBS | HEIGHT: 64 IN

## 2024-11-22 DIAGNOSIS — E66.9 OBESITY, UNSPECIFIED: ICD-10-CM

## 2024-11-22 PROCEDURE — 99213 OFFICE O/P EST LOW 20 MIN: CPT | Mod: 95

## 2024-11-22 RX ORDER — SEMAGLUTIDE 1 MG/.5ML
1 INJECTION, SOLUTION SUBCUTANEOUS
Qty: 1 | Refills: 0 | Status: ACTIVE | COMMUNITY
Start: 2024-11-22 | End: 1900-01-01

## 2024-12-09 ENCOUNTER — APPOINTMENT (OUTPATIENT)
Dept: DERMATOLOGY | Facility: CLINIC | Age: 29
End: 2024-12-09

## 2024-12-11 ENCOUNTER — NON-APPOINTMENT (OUTPATIENT)
Age: 29
End: 2024-12-11

## 2024-12-12 ENCOUNTER — NON-APPOINTMENT (OUTPATIENT)
Age: 29
End: 2024-12-12

## 2024-12-18 ENCOUNTER — APPOINTMENT (OUTPATIENT)
Dept: DERMATOLOGY | Facility: CLINIC | Age: 29
End: 2024-12-18
Payer: COMMERCIAL

## 2024-12-18 PROCEDURE — 99213 OFFICE O/P EST LOW 20 MIN: CPT

## 2024-12-25 PROBLEM — F10.90 ALCOHOL USE: Status: ACTIVE | Noted: 2023-06-29

## 2025-01-20 ENCOUNTER — APPOINTMENT (OUTPATIENT)
Dept: DERMATOLOGY | Facility: CLINIC | Age: 30
End: 2025-01-20
Payer: COMMERCIAL

## 2025-01-20 ENCOUNTER — NON-APPOINTMENT (OUTPATIENT)
Age: 30
End: 2025-01-20

## 2025-01-20 PROCEDURE — 99212 OFFICE O/P EST SF 10 MIN: CPT

## 2025-01-20 RX ORDER — SEMAGLUTIDE 1.7 MG/.75ML
1.7 INJECTION, SOLUTION SUBCUTANEOUS
Qty: 1 | Refills: 0 | Status: COMPLETED | COMMUNITY
Start: 2025-01-17 | End: 2025-01-20

## 2025-01-24 ENCOUNTER — APPOINTMENT (OUTPATIENT)
Dept: OBGYN | Facility: CLINIC | Age: 30
End: 2025-01-24
Payer: COMMERCIAL

## 2025-01-24 VITALS — WEIGHT: 170 LBS | HEIGHT: 64 IN | BODY MASS INDEX: 29.02 KG/M2

## 2025-01-24 DIAGNOSIS — R11.0 NAUSEA: ICD-10-CM

## 2025-01-24 DIAGNOSIS — E66.9 OBESITY, UNSPECIFIED: ICD-10-CM

## 2025-01-24 PROCEDURE — 99214 OFFICE O/P EST MOD 30 MIN: CPT | Mod: 95

## 2025-01-24 RX ORDER — ONDANSETRON 8 MG/1
8 TABLET, ORALLY DISINTEGRATING ORAL EVERY 8 HOURS
Qty: 20 | Refills: 2 | Status: ACTIVE | COMMUNITY
Start: 2025-01-24 | End: 1900-01-01

## 2025-01-24 RX ORDER — SEMAGLUTIDE 1.7 MG/.75ML
1.7 INJECTION, SOLUTION SUBCUTANEOUS
Qty: 1 | Refills: 1 | Status: ACTIVE | COMMUNITY
Start: 2025-01-24 | End: 1900-01-01

## 2025-02-04 ENCOUNTER — APPOINTMENT (OUTPATIENT)
Dept: DERMATOLOGY | Facility: CLINIC | Age: 30
End: 2025-02-04
Payer: COMMERCIAL

## 2025-02-04 PROCEDURE — 99213 OFFICE O/P EST LOW 20 MIN: CPT

## 2025-04-29 ENCOUNTER — NON-APPOINTMENT (OUTPATIENT)
Age: 30
End: 2025-04-29

## 2025-05-12 ENCOUNTER — APPOINTMENT (OUTPATIENT)
Age: 30
End: 2025-05-12
Payer: COMMERCIAL

## 2025-05-12 PROCEDURE — 97811 ACUP 1/> W/O ESTIM EA ADD 15: CPT

## 2025-05-12 PROCEDURE — 97810 ACUP 1/> WO ESTIM 1ST 15 MIN: CPT

## 2025-05-19 ENCOUNTER — APPOINTMENT (OUTPATIENT)
Age: 30
End: 2025-05-19
Payer: COMMERCIAL

## 2025-05-19 PROCEDURE — 97811 ACUP 1/> W/O ESTIM EA ADD 15: CPT

## 2025-05-19 PROCEDURE — 97810 ACUP 1/> WO ESTIM 1ST 15 MIN: CPT

## 2025-06-05 ENCOUNTER — APPOINTMENT (OUTPATIENT)
Dept: OBGYN | Facility: CLINIC | Age: 30
End: 2025-06-05
Payer: COMMERCIAL

## 2025-06-05 VITALS
HEIGHT: 64 IN | SYSTOLIC BLOOD PRESSURE: 123 MMHG | BODY MASS INDEX: 31.41 KG/M2 | WEIGHT: 184 LBS | DIASTOLIC BLOOD PRESSURE: 82 MMHG

## 2025-06-05 DIAGNOSIS — Z01.419 ENCOUNTER FOR GYNECOLOGICAL EXAMINATION (GENERAL) (ROUTINE) W/OUT ABNORMAL FINDINGS: ICD-10-CM

## 2025-06-05 PROCEDURE — 99385 PREV VISIT NEW AGE 18-39: CPT

## 2025-06-09 LAB
CYTOLOGY CVX/VAG DOC THIN PREP: NORMAL
HPV HIGH+LOW RISK DNA PNL CVX: NOT DETECTED

## 2025-06-16 ENCOUNTER — APPOINTMENT (OUTPATIENT)
Age: 30
End: 2025-06-16
Payer: COMMERCIAL

## 2025-06-16 PROCEDURE — 97811 ACUP 1/> W/O ESTIM EA ADD 15: CPT

## 2025-06-16 PROCEDURE — 97810 ACUP 1/> WO ESTIM 1ST 15 MIN: CPT

## 2025-06-19 ENCOUNTER — APPOINTMENT (OUTPATIENT)
Dept: ORTHOPEDIC SURGERY | Facility: CLINIC | Age: 30
End: 2025-06-19
Payer: COMMERCIAL

## 2025-06-19 VITALS
WEIGHT: 182 LBS | BODY MASS INDEX: 31.07 KG/M2 | HEIGHT: 64 IN | DIASTOLIC BLOOD PRESSURE: 78 MMHG | SYSTOLIC BLOOD PRESSURE: 121 MMHG | HEART RATE: 88 BPM

## 2025-06-19 PROBLEM — M54.2 CERVICALGIA: Status: ACTIVE | Noted: 2025-06-19

## 2025-06-19 PROCEDURE — 99203 OFFICE O/P NEW LOW 30 MIN: CPT

## 2025-06-19 PROCEDURE — 72040 X-RAY EXAM NECK SPINE 2-3 VW: CPT

## 2025-06-26 ENCOUNTER — RX RENEWAL (OUTPATIENT)
Age: 30
End: 2025-06-26

## 2025-06-27 PROBLEM — B37.31 VAGINAL YEAST INFECTION: Status: ACTIVE | Noted: 2024-10-11

## 2025-06-27 RX ORDER — TERCONAZOLE 8 MG/G
0.8 CREAM VAGINAL
Qty: 1 | Refills: 1 | Status: ACTIVE | COMMUNITY
Start: 2025-06-27 | End: 1900-01-01

## 2025-07-02 ENCOUNTER — APPOINTMENT (OUTPATIENT)
Dept: ELECTROPHYSIOLOGY | Facility: CLINIC | Age: 30
End: 2025-07-02

## 2025-07-15 PROBLEM — M26.629 TMJ ARTHRALGIA: Status: ACTIVE | Noted: 2025-07-15

## 2025-07-15 RX ORDER — METHOCARBAMOL 750 MG/1
750 TABLET, FILM COATED ORAL EVERY 6 HOURS
Qty: 60 | Refills: 0 | Status: ACTIVE | COMMUNITY
Start: 2025-07-15 | End: 1900-01-01

## 2025-07-16 ENCOUNTER — APPOINTMENT (OUTPATIENT)
Dept: ELECTROPHYSIOLOGY | Facility: CLINIC | Age: 30
End: 2025-07-16
Payer: COMMERCIAL

## 2025-07-16 VITALS
HEIGHT: 64 IN | WEIGHT: 184 LBS | DIASTOLIC BLOOD PRESSURE: 62 MMHG | BODY MASS INDEX: 31.41 KG/M2 | HEART RATE: 79 BPM | SYSTOLIC BLOOD PRESSURE: 108 MMHG | OXYGEN SATURATION: 98 %

## 2025-07-16 PROCEDURE — 99214 OFFICE O/P EST MOD 30 MIN: CPT

## 2025-07-16 PROCEDURE — 93000 ELECTROCARDIOGRAM COMPLETE: CPT

## 2025-07-19 ENCOUNTER — APPOINTMENT (OUTPATIENT)
Dept: FAMILY MEDICINE | Facility: CLINIC | Age: 30
End: 2025-07-19

## 2025-07-21 ENCOUNTER — NON-APPOINTMENT (OUTPATIENT)
Age: 30
End: 2025-07-21

## 2025-07-31 ENCOUNTER — APPOINTMENT (OUTPATIENT)
Dept: ORTHOPEDIC SURGERY | Facility: CLINIC | Age: 30
End: 2025-07-31

## 2025-08-26 ENCOUNTER — NON-APPOINTMENT (OUTPATIENT)
Age: 30
End: 2025-08-26